# Patient Record
Sex: FEMALE | Race: WHITE | Employment: UNEMPLOYED | URBAN - METROPOLITAN AREA
[De-identification: names, ages, dates, MRNs, and addresses within clinical notes are randomized per-mention and may not be internally consistent; named-entity substitution may affect disease eponyms.]

---

## 2017-01-27 ENCOUNTER — OFFICE VISIT (OUTPATIENT)
Dept: FAMILY MEDICINE CLINIC | Facility: CLINIC | Age: 62
End: 2017-01-27

## 2017-01-27 ENCOUNTER — TELEPHONE (OUTPATIENT)
Dept: FAMILY MEDICINE CLINIC | Facility: CLINIC | Age: 62
End: 2017-01-27

## 2017-01-27 VITALS
WEIGHT: 113.38 LBS | HEART RATE: 90 BPM | TEMPERATURE: 98 F | BODY MASS INDEX: 19 KG/M2 | DIASTOLIC BLOOD PRESSURE: 62 MMHG | SYSTOLIC BLOOD PRESSURE: 92 MMHG

## 2017-01-27 DIAGNOSIS — R05.9 COUGH: Primary | ICD-10-CM

## 2017-01-27 PROCEDURE — 99212 OFFICE O/P EST SF 10 MIN: CPT | Performed by: FAMILY MEDICINE

## 2017-01-27 PROCEDURE — 99213 OFFICE O/P EST LOW 20 MIN: CPT | Performed by: FAMILY MEDICINE

## 2017-01-27 RX ORDER — AZITHROMYCIN 250 MG/1
TABLET, FILM COATED ORAL
Qty: 6 TABLET | Refills: 0 | Status: SHIPPED | OUTPATIENT
Start: 2017-01-27 | End: 2017-12-11

## 2017-01-27 NOTE — TELEPHONE ENCOUNTER
Dr. Jesse More - pt c/o dry cough since Wednesday accompanied with sob when laying down. 2 of her coworkers were recently dx with pna. Denies chest pain, sputum, edema. Pt only wanted to see you, but no appt available. I scheduled her with Dr. Krishnan Sat at 2p.

## 2017-01-30 NOTE — PROGRESS NOTES
HPI:    Antony Norton is a 62year old female presents to clinic with concerns of a cough. Patient states that 3 people at her work have CAP. Cough started on Monday and has been getting progressively worse.  Cough is dry but she feels like there is co Discomfort  Oxycodone                   Comment:Other reaction(s): Diarrhea  Oxycodone-Acetamino*    Nausea only    Comment:GI upset.   Sulfanilamide               Comment:Other reaction(s): Discomfort      ROS:   Review of Systems   Constitutional: Positiv (primary encounter diagnosis)  - because of recent exposure, will treat for CAP  - Azithromycin 5 day course to pharmacy  - supportive care, adequate hydration, and rest advised  - to follow up if no improvement in 5-7 days, will order a CXR        Orders

## 2017-10-24 ENCOUNTER — TELEPHONE (OUTPATIENT)
Dept: OTHER | Age: 62
End: 2017-10-24

## 2017-10-24 RX ORDER — AMOXICILLIN 500 MG/1
500 TABLET, FILM COATED ORAL 2 TIMES DAILY
Qty: 4 TABLET | Refills: 0 | Status: SHIPPED | OUTPATIENT
Start: 2017-10-24 | End: 2017-12-11

## 2017-10-24 NOTE — TELEPHONE ENCOUNTER
Dr Jaycob Dao, please advise. Pt says she has had a knee replacement and hip replacement and she has to have antibiotics every time she gets her teeth cleaned. Pt has appt tomorrow and needs antibiotics as soon as possible.  Pt says she gets 500 mg of Amoxi

## 2017-10-25 NOTE — TELEPHONE ENCOUNTER
Pt informed Rx sent by [facility] as requested. Denies further questions/concerns at this time.

## 2017-11-04 ENCOUNTER — TELEPHONE (OUTPATIENT)
Dept: OTHER | Age: 62
End: 2017-11-04

## 2017-11-04 NOTE — TELEPHONE ENCOUNTER
pt calling us for verification on the abx she picked up a couple days ago.  She stated that she usually needs to take a abx before having her teeth clean and she is used to the direction reading take 4 tablets of 500 mg of amoxicillin 1 hour before t

## 2017-12-11 ENCOUNTER — OFFICE VISIT (OUTPATIENT)
Dept: FAMILY MEDICINE CLINIC | Facility: CLINIC | Age: 62
End: 2017-12-11

## 2017-12-11 VITALS
HEIGHT: 64.5 IN | BODY MASS INDEX: 20.24 KG/M2 | HEART RATE: 75 BPM | TEMPERATURE: 97 F | RESPIRATION RATE: 16 BRPM | SYSTOLIC BLOOD PRESSURE: 100 MMHG | DIASTOLIC BLOOD PRESSURE: 65 MMHG | WEIGHT: 120 LBS

## 2017-12-11 DIAGNOSIS — M81.0 OSTEOPOROSIS, UNSPECIFIED OSTEOPOROSIS TYPE, UNSPECIFIED PATHOLOGICAL FRACTURE PRESENCE: ICD-10-CM

## 2017-12-11 DIAGNOSIS — G89.29 CHRONIC NECK PAIN: ICD-10-CM

## 2017-12-11 DIAGNOSIS — R51.9 NONINTRACTABLE HEADACHE, UNSPECIFIED CHRONICITY PATTERN, UNSPECIFIED HEADACHE TYPE: Primary | ICD-10-CM

## 2017-12-11 DIAGNOSIS — S12.9XXS COMPRESSION FRACTURE OF C-SPINE, SEQUELA: ICD-10-CM

## 2017-12-11 DIAGNOSIS — M54.2 CHRONIC NECK PAIN: ICD-10-CM

## 2017-12-11 PROBLEM — G44.309 POST-TRAUMATIC HEADACHE: Status: ACTIVE | Noted: 2017-12-11

## 2017-12-11 PROCEDURE — 99214 OFFICE O/P EST MOD 30 MIN: CPT | Performed by: FAMILY MEDICINE

## 2017-12-11 PROCEDURE — 99212 OFFICE O/P EST SF 10 MIN: CPT | Performed by: FAMILY MEDICINE

## 2017-12-11 RX ORDER — VITAMIN B COMPLEX
1 TABLET ORAL DAILY
COMMUNITY

## 2017-12-11 RX ORDER — PHENOL 1.4 %
600 AEROSOL, SPRAY (ML) MUCOUS MEMBRANE 2 TIMES DAILY
COMMUNITY

## 2017-12-11 NOTE — PATIENT INSTRUCTIONS
May need physical therapy. May benefit from physiatry. OMT done. Keep ENT appointments and use of mouth guard. MRI of cervical spine ordered.

## 2017-12-12 NOTE — PROGRESS NOTES
HPI:    Patient ID: Jesus Rolle is a 65year old female. Headache    This is a chronic problem. The current episode started more than 1 year ago. The problem occurs constantly. The problem has been waxing and waning.  The pain is located in the [place] ) 97.4 °F (36.3 °C)   TempSrc: Oral   Weight: 120 lb (54.4 kg)   Height: 5' 4.5\" (1.638 m)         Body mass index is 20.28 kg/m². PHYSICAL EXAM:   Physical Exam    Constitutional: She is oriented to person, place, and time and thin. No distress.    H

## 2017-12-22 ENCOUNTER — HOSPITAL ENCOUNTER (OUTPATIENT)
Dept: MRI IMAGING | Facility: HOSPITAL | Age: 62
Discharge: HOME | End: 2017-12-22
Attending: FAMILY MEDICINE
Payer: COMMERCIAL

## 2017-12-22 DIAGNOSIS — M54.2 CHRONIC NECK PAIN: ICD-10-CM

## 2017-12-22 DIAGNOSIS — G89.29 CHRONIC NECK PAIN: ICD-10-CM

## 2017-12-22 DIAGNOSIS — S12.9XXS COMPRESSION FRACTURE OF C-SPINE, SEQUELA: ICD-10-CM

## 2017-12-22 PROCEDURE — 72141 MRI NECK SPINE W/O DYE: CPT | Performed by: FAMILY MEDICINE

## 2017-12-27 ENCOUNTER — TELEPHONE (OUTPATIENT)
Dept: OTHER | Age: 62
End: 2017-12-27

## 2017-12-27 NOTE — TELEPHONE ENCOUNTER
Notes Recorded by Teressa Polk RN on 12/27/2017 at 3:31 PM CST  See encounter 12/27/17.  ------    Notes Recorded by Eb Shane DO on 12/25/2017 at 3:12 PM CST  There are some narrowed canal spaces seen on cervical canal. Also a possible finding

## 2017-12-29 ENCOUNTER — TELEPHONE (OUTPATIENT)
Dept: FAMILY MEDICINE CLINIC | Facility: CLINIC | Age: 62
End: 2017-12-29

## 2017-12-29 NOTE — TELEPHONE ENCOUNTER
----- Message from Jesus Hayes DO sent at 12/25/2017  3:12 PM CST -----  There are some narrowed canal spaces seen on cervical canal. Also a possible finding on one of the thoracic vertebrae. Recommend neurosurgery consult.  See if patient is agreea

## 2017-12-29 NOTE — TELEPHONE ENCOUNTER
Patient returned call. Gave patient results and recommendations. She would like to know what a neurosurgeon would be telling her differently besides surgery. Patient would like a better explanation of what could be done besides consulting a neurosurgeon.  P

## 2017-12-30 NOTE — TELEPHONE ENCOUNTER
Called patient - verified patient's name and  - pt states she talked to [facility] this morning and she does not need a referral.

## 2018-02-09 ENCOUNTER — NURSE TRIAGE (OUTPATIENT)
Dept: OTHER | Age: 63
End: 2018-02-09

## 2018-02-09 RX ORDER — NITROFURANTOIN 25; 75 MG/1; MG/1
100 CAPSULE ORAL 2 TIMES DAILY
Qty: 10 CAPSULE | Refills: 0 | Status: SHIPPED | OUTPATIENT
Start: 2018-02-09 | End: 2018-02-14

## 2018-02-09 NOTE — TELEPHONE ENCOUNTER
Please let pt know Rx sent to pharmacy, but very important to follow up in office in 1-2 wks to recheck urine.

## 2018-02-09 NOTE — TELEPHONE ENCOUNTER
Spoke with pt and Dr [employer] message given.   Pt verb understanding and agrees to call back to schedule f/u visit after completion of antibiotics

## 2018-03-05 ENCOUNTER — OFFICE VISIT (OUTPATIENT)
Dept: FAMILY MEDICINE CLINIC | Facility: CLINIC | Age: 63
End: 2018-03-05

## 2018-03-05 VITALS
HEIGHT: 64.5 IN | HEART RATE: 72 BPM | WEIGHT: 113 LBS | SYSTOLIC BLOOD PRESSURE: 103 MMHG | RESPIRATION RATE: 16 BRPM | DIASTOLIC BLOOD PRESSURE: 64 MMHG | TEMPERATURE: 98 F | BODY MASS INDEX: 19.06 KG/M2

## 2018-03-05 DIAGNOSIS — N39.0 URINARY TRACT INFECTION WITHOUT HEMATURIA, SITE UNSPECIFIED: Primary | ICD-10-CM

## 2018-03-05 LAB
BILIRUB UR QL: NEGATIVE
CLARITY UR: CLEAR
COLOR UR: YELLOW
GLUCOSE UR-MCNC: NEGATIVE MG/DL
HGB UR QL STRIP.AUTO: NEGATIVE
KETONES UR-MCNC: NEGATIVE MG/DL
LEUKOCYTE ESTERASE UR QL STRIP.AUTO: NEGATIVE
NITRITE UR QL STRIP.AUTO: NEGATIVE
PH UR: 6 [PH] (ref 5–8)
PROT UR-MCNC: NEGATIVE MG/DL
RBC #/AREA URNS AUTO: 1 /HPF
SP GR UR STRIP: 1.01 (ref 1–1.03)
UROBILINOGEN UR STRIP-ACNC: <2
VIT C UR-MCNC: 40 MG/DL
WBC #/AREA URNS AUTO: 1 /HPF

## 2018-03-05 PROCEDURE — 99213 OFFICE O/P EST LOW 20 MIN: CPT | Performed by: FAMILY MEDICINE

## 2018-03-05 PROCEDURE — 99212 OFFICE O/P EST SF 10 MIN: CPT | Performed by: FAMILY MEDICINE

## 2018-03-05 RX ORDER — NITROFURANTOIN 25; 75 MG/1; MG/1
CAPSULE ORAL
Refills: 0 | COMMUNITY
Start: 2018-02-09 | End: 2019-11-06 | Stop reason: ALTCHOICE

## 2018-03-05 NOTE — PROGRESS NOTES
HPI:    Patient ID: Jesus Rolle is a 65year old female. Recently treated for documented UTI. Symptoms have completely resolved. Dysuria    This is a new problem. The current episode started in the past 7 days.  The problem occurs every urinati sounds normal.   Neurological: She is alert and oriented to person, place, and time. No cranial nerve deficit. ASSESSMENT/PLAN:   1.  Urinary tract infection without hematuria, site unspecified  Studies sent  - URINALYSIS, ROUTINE; Future  - UR

## 2019-10-15 ENCOUNTER — TELEPHONE (OUTPATIENT)
Dept: FAMILY MEDICINE CLINIC | Facility: CLINIC | Age: 64
End: 2019-10-15

## 2019-10-15 RX ORDER — AMOXICILLIN 500 MG/1
CAPSULE ORAL
Qty: 4 CAPSULE | Refills: 0 | Status: SHIPPED | OUTPATIENT
Start: 2019-10-15 | End: 2019-11-06 | Stop reason: ALTCHOICE

## 2019-10-15 NOTE — TELEPHONE ENCOUNTER
Patient called in requested antibiotic that she usually get when she get her teeth clean.     Patient could not remember the name of the antibotic

## 2019-10-16 NOTE — TELEPHONE ENCOUNTER
Called pt to see when dentist appt.  What surgery did she have that requires her to take antibiotics before teeth cleaning     Please advise regarding medication request.

## 2019-11-06 ENCOUNTER — OFFICE VISIT (OUTPATIENT)
Dept: FAMILY MEDICINE CLINIC | Facility: CLINIC | Age: 64
End: 2019-11-06
Payer: COMMERCIAL

## 2019-11-06 VITALS
SYSTOLIC BLOOD PRESSURE: 103 MMHG | WEIGHT: 112.5 LBS | HEIGHT: 64.5 IN | HEART RATE: 67 BPM | BODY MASS INDEX: 18.97 KG/M2 | TEMPERATURE: 98 F | DIASTOLIC BLOOD PRESSURE: 59 MMHG

## 2019-11-06 DIAGNOSIS — Z01.818 PRE-OPERATIVE CLEARANCE: Primary | ICD-10-CM

## 2019-11-06 PROCEDURE — 99213 OFFICE O/P EST LOW 20 MIN: CPT | Performed by: FAMILY MEDICINE

## 2019-11-06 NOTE — PROGRESS NOTES
HPI:    Antonina Griffin is a 62year old female presents to clinic for preoperative physical.  Patient is having surgery to correct a deviated nasal septum on 11/14/2019 at [facility] with Dr. Bonny Anderson. Denies any acute concerns.   Had labs, chest x- Multiple Vitamin (MULTI-VITAMIN) Oral Tab Take 1 tablet by mouth daily.          Allergies:    Acetaminophen               Comment:Other reaction(s): Diarrhea  Acyclovir                   Comment:Other reaction(s): Rash  Cephalexin              HIVES    Com fax clearance to surgeon's office  -follow up as needed    Responsible party/patient verbalized understanding of information discussed. No barriers to learning observed.          Orders This Visit:  No orders of the defined types were placed in this encount

## 2019-12-04 ENCOUNTER — OFFICE VISIT (OUTPATIENT)
Dept: FAMILY MEDICINE CLINIC | Facility: CLINIC | Age: 64
End: 2019-12-04
Payer: COMMERCIAL

## 2019-12-04 ENCOUNTER — NURSE TRIAGE (OUTPATIENT)
Dept: FAMILY MEDICINE CLINIC | Facility: CLINIC | Age: 64
End: 2019-12-04

## 2019-12-04 VITALS
TEMPERATURE: 98 F | HEART RATE: 71 BPM | WEIGHT: 110 LBS | SYSTOLIC BLOOD PRESSURE: 98 MMHG | DIASTOLIC BLOOD PRESSURE: 61 MMHG | BODY MASS INDEX: 19 KG/M2 | RESPIRATION RATE: 16 BRPM

## 2019-12-04 DIAGNOSIS — R19.7 DIARRHEA, UNSPECIFIED TYPE: Primary | ICD-10-CM

## 2019-12-04 PROCEDURE — 99213 OFFICE O/P EST LOW 20 MIN: CPT | Performed by: FAMILY MEDICINE

## 2019-12-04 NOTE — TELEPHONE ENCOUNTER
Patient called in stating that she, , and mother all had the same dinner two night ago, only difference was she had a salad.      Since this dinner she has experienced diarrhea and having issues keeping food down  and is expressing concern that she m

## 2019-12-04 NOTE — TELEPHONE ENCOUNTER
She may, especially due to the recent antibiotic use. She would not be able to give sample at office. We have patients do that at home.    She would take the collection kits home with her

## 2019-12-04 NOTE — TELEPHONE ENCOUNTER
Action Requested: Summary for Provider     []  Critical Lab, Recommendations Needed  [x] Need Additional Advice  []   FYI    []   Need Orders  [] Need Medications Sent to Pharmacy  []  Other     SUMMARY: patient called in as she ordered dinner from [facility]

## 2019-12-05 ENCOUNTER — LAB ENCOUNTER (OUTPATIENT)
Dept: LAB | Age: 64
End: 2019-12-05
Attending: FAMILY MEDICINE
Payer: COMMERCIAL

## 2019-12-05 DIAGNOSIS — R19.7 DIARRHEA, UNSPECIFIED TYPE: ICD-10-CM

## 2019-12-05 PROCEDURE — 87493 C DIFF AMPLIFIED PROBE: CPT

## 2019-12-05 PROCEDURE — 87272 CRYPTOSPORIDIUM AG IF: CPT

## 2019-12-05 PROCEDURE — 87045 FECES CULTURE AEROBIC BACT: CPT

## 2019-12-05 PROCEDURE — 87427 SHIGA-LIKE TOXIN AG IA: CPT

## 2019-12-05 PROCEDURE — 87046 STOOL CULTR AEROBIC BACT EA: CPT

## 2019-12-05 PROCEDURE — 87329 GIARDIA AG IA: CPT

## 2019-12-05 NOTE — PROGRESS NOTES
HPI: Shawnee Pickard is a 62year old female who presents for diarrhea. Has had diarrhea since Sunday night. Ate salad from [facility]. Since then, she has had diarrhea. Has abdominal cramping and goes several times after she eats. No fever. Mild nausea.   No vo

## 2019-12-06 ENCOUNTER — TELEPHONE (OUTPATIENT)
Dept: FAMILY MEDICINE CLINIC | Facility: CLINIC | Age: 64
End: 2019-12-06

## 2019-12-06 RX ORDER — VANCOMYCIN HYDROCHLORIDE 125 MG/1
125 CAPSULE ORAL 4 TIMES DAILY
Qty: 40 CAPSULE | Refills: 0 | Status: SHIPPED | OUTPATIENT
Start: 2019-12-06 | End: 2019-12-16

## 2019-12-06 NOTE — TELEPHONE ENCOUNTER
Pt asking what type of food to eat , advised can eat regular food, diarrhea started by abx that caused c diff, verbalized understanding

## 2019-12-06 NOTE — TELEPHONE ENCOUNTER
Left message for patient to call back. If she calls please inform her that her stool was positive for C. difficile. This is a type and the diarrhea often associated with having taken clindamycin (note says she was recently on this).   Treated with vancomy

## 2019-12-06 NOTE — TELEPHONE ENCOUNTER
Spoke with Tay Aceves from [facility] and reports + C diff    Please advise    Sent to Dr. Juliana Tipton (out of office) and Dr. Jose Luis Patterson (on call)    Please reply to ny: TERENCE MUNOZ [address], [address] 12/06/2019

## 2019-12-10 ENCOUNTER — TELEPHONE (OUTPATIENT)
Dept: FAMILY MEDICINE CLINIC | Facility: CLINIC | Age: 64
End: 2019-12-10

## 2019-12-18 ENCOUNTER — TELEPHONE (OUTPATIENT)
Dept: FAMILY MEDICINE CLINIC | Facility: CLINIC | Age: 64
End: 2019-12-18

## 2019-12-18 NOTE — TELEPHONE ENCOUNTER
Has finished the course of antibiotics for  C Diff  Asking if she needed any specific  follow up , states diarrhea is improved, loose and soft stool  Frequency is now three time day   She is taking probiotics     Asking what is next step ? ?   Does she need

## 2019-12-18 NOTE — TELEPHONE ENCOUNTER
No need to repeat labs unless she continues to have symptoms. No need to do anything else at this time.

## 2020-01-16 ENCOUNTER — OFFICE VISIT (OUTPATIENT)
Dept: FAMILY MEDICINE CLINIC | Facility: CLINIC | Age: 65
End: 2020-01-16
Payer: COMMERCIAL

## 2020-01-16 VITALS
DIASTOLIC BLOOD PRESSURE: 53 MMHG | TEMPERATURE: 98 F | SYSTOLIC BLOOD PRESSURE: 91 MMHG | WEIGHT: 111 LBS | BODY MASS INDEX: 19 KG/M2 | HEART RATE: 66 BPM | RESPIRATION RATE: 16 BRPM

## 2020-01-16 DIAGNOSIS — Z96.60 HISTORY OF JOINT REPLACEMENT, UNSPECIFIED JOINT: ICD-10-CM

## 2020-01-16 DIAGNOSIS — J01.01 ACUTE RECURRENT MAXILLARY SINUSITIS: Primary | ICD-10-CM

## 2020-01-16 DIAGNOSIS — A04.72 CLOSTRIDIUM DIFFICILE DIARRHEA: ICD-10-CM

## 2020-01-16 PROCEDURE — 99213 OFFICE O/P EST LOW 20 MIN: CPT | Performed by: FAMILY MEDICINE

## 2020-01-16 NOTE — PROGRESS NOTES
HPI: Laura Garcia is a 62year old female who presents for sinusitis. Had recent septoplasty. She had a headache on the right of face for the past 2 days. Improved today. No headache today.  Having thick drainage on right and has trouble breathing out of right diagnosis)    Symptoms improved so will not treat at this time. Advised to follow-up with ENT. Clostridium difficile diarrhea     Symptoms improved.      History of joint replacement, unspecified joint    Informed her that she does not need abx prophyla

## 2020-03-20 ENCOUNTER — TELEPHONE (OUTPATIENT)
Dept: FAMILY MEDICINE CLINIC | Facility: CLINIC | Age: 65
End: 2020-03-20

## 2020-03-20 NOTE — TELEPHONE ENCOUNTER
Patient calling stating in mid February she got really sick with a cough, shortness of breath, and fever   Patient  states she is feeling better now but has a mild dry cough, no fever.    Patient is concerned because during the time she was sick she had her

## 2020-03-20 NOTE — TELEPHONE ENCOUNTER
Called patient. Does not meet criteria for testing. All questions answered. Will call back with further concerns. Responsible party/patient verbalized understanding of information discussed. No barriers to learning observed.

## 2020-03-30 ENCOUNTER — NURSE TRIAGE (OUTPATIENT)
Dept: FAMILY MEDICINE CLINIC | Facility: CLINIC | Age: 65
End: 2020-03-30

## 2020-03-30 DIAGNOSIS — A49.8 CLOSTRIDIUM DIFFICILE INFECTION: Primary | ICD-10-CM

## 2020-03-30 PROCEDURE — 99212 OFFICE O/P EST SF 10 MIN: CPT | Performed by: FAMILY MEDICINE

## 2020-03-30 RX ORDER — VANCOMYCIN HYDROCHLORIDE 250 MG/1
250 CAPSULE ORAL 4 TIMES DAILY
Qty: 56 CAPSULE | Refills: 0 | Status: SHIPPED | OUTPATIENT
Start: 2020-03-30

## 2020-03-30 NOTE — TELEPHONE ENCOUNTER
Virtual/Telephone Check-In    Sudheer Casey verbally consents to a Virtual/Telephone Check-In service on 03/30/20. Patient understands and accepts financial responsibility for any deductible, co-insurance and/or co-pays associated with this service.

## 2020-03-30 NOTE — TELEPHONE ENCOUNTER
Action Requested: Summary for Provider     []  Critical Lab, Recommendations Needed  [] Need Additional Advice  []   FYI    []   Need Orders  [] Need Medications Sent to Pharmacy  []  Other     SUMMARY: Per protocol OV today is appropriate.  Pt would prefer

## 2020-08-08 ENCOUNTER — TELEPHONE (OUTPATIENT)
Dept: FAMILY MEDICINE CLINIC | Facility: CLINIC | Age: 65
End: 2020-08-08

## 2020-08-08 NOTE — TELEPHONE ENCOUNTER
Patient  calling stating she had a knee surgery in 2010 at [facility]. Per patient, she has an order from [facility] to get an x-ray of her knee. Patient unsure if she needs a new order from Dr. Conchita Ace to get x-ray completed.      Patient transferre

## 2020-09-10 ENCOUNTER — APPOINTMENT (OUTPATIENT)
Dept: GENERAL RADIOLOGY | Age: 65
End: 2020-09-10
Attending: NURSE PRACTITIONER
Payer: COMMERCIAL

## 2020-09-10 ENCOUNTER — TELEPHONE (OUTPATIENT)
Dept: FAMILY MEDICINE CLINIC | Facility: CLINIC | Age: 65
End: 2020-09-10

## 2020-09-10 ENCOUNTER — HOSPITAL ENCOUNTER (OUTPATIENT)
Age: 65
Discharge: HOME | End: 2020-09-10
Payer: COMMERCIAL

## 2020-09-10 VITALS
SYSTOLIC BLOOD PRESSURE: 105 MMHG | BODY MASS INDEX: 19.63 KG/M2 | TEMPERATURE: 97 F | WEIGHT: 115 LBS | RESPIRATION RATE: 18 BRPM | HEIGHT: 64 IN | DIASTOLIC BLOOD PRESSURE: 64 MMHG | HEART RATE: 74 BPM | OXYGEN SATURATION: 99 %

## 2020-09-10 DIAGNOSIS — M62.830 SPASM OF MUSCLE OF LOWER BACK: Primary | ICD-10-CM

## 2020-09-10 DIAGNOSIS — M54.40 ACUTE RIGHT-SIDED LOW BACK PAIN WITH SCIATICA, SCIATICA LATERALITY UNSPECIFIED: ICD-10-CM

## 2020-09-10 PROCEDURE — 73502 X-RAY EXAM HIP UNI 2-3 VIEWS: CPT | Performed by: NURSE PRACTITIONER

## 2020-09-10 PROCEDURE — 99203 OFFICE O/P NEW LOW 30 MIN: CPT | Performed by: NURSE PRACTITIONER

## 2020-09-10 PROCEDURE — 72100 X-RAY EXAM L-S SPINE 2/3 VWS: CPT | Performed by: NURSE PRACTITIONER

## 2020-09-10 RX ORDER — HYDROCODONE BITARTRATE AND ACETAMINOPHEN 5; 325 MG/1; MG/1
1 TABLET ORAL ONCE
Status: COMPLETED | OUTPATIENT
Start: 2020-09-10 | End: 2020-09-10

## 2020-09-10 RX ORDER — HYDROCODONE BITARTRATE AND ACETAMINOPHEN 5; 325 MG/1; MG/1
1-2 TABLET ORAL 2 TIMES DAILY PRN
Qty: 10 TABLET | Refills: 0 | Status: SHIPPED | OUTPATIENT
Start: 2020-09-10

## 2020-09-10 NOTE — TELEPHONE ENCOUNTER
Action Requested: Summary for Provider     []  Critical Lab, Recommendations Needed  [] Need Additional Advice  []   FYI    []   Need Orders  [] Need Medications Sent to Pharmacy  []  Other     SUMMARY: Pt going to IC for back pain,    Pt states yesterday

## 2020-09-10 NOTE — ED PROVIDER NOTES
Patient Seen [address]: [address]      History   Patient presents with:  Back Pain    Stated Complaint: BACK PAIN    HPI    This is a 67-year-old female presenting with right lower back pain.   Patient states after coughing las as noted above.     Physical Exam     ED Triage Vitals [09/10/20 3164]   /64   Pulse 74   Resp 18   Temp 97.1 °F (36.2 °C)   Temp src    SpO2 99 %   O2 Device None (Room air)       Current:/64   Pulse 74   Temp 97.1 °F (36.2 °C)   Resp 18   Ht 1 but new since 6/28/11 CT. 2.  Chronic degenerative disc disease and osteoarthritis within the lumbar spine most advanced at L4-S1.    Dictated by (CST): Ara Du MD on 9/10/2020 at 5:22 PM     Finalized by (CST): Ara Du MD on 9/10/2020 at 5:24 PM Discussed medications that can be prescribed discussed muscle relaxer. Patient states she does not want to take a muscle relaxer as she is very sensitive to medications or new medications and does not want to have any type of reaction.   Discussed with the

## 2020-09-10 NOTE — ED INITIAL ASSESSMENT (HPI)
Pt here with complaints of right lower back pain , pt states she was cough bad last and heard a loud pop to her right lower back and fell to the ground, pt has a hx of compression fractures , pt has limited rom

## 2021-06-04 ENCOUNTER — NURSE TRIAGE (OUTPATIENT)
Dept: FAMILY MEDICINE CLINIC | Facility: CLINIC | Age: 66
End: 2021-06-04

## 2021-06-04 NOTE — TELEPHONE ENCOUNTER
Action Requested: Summary for Provider     []  Critical Lab, Recommendations Needed  [] Need Additional Advice  []   FYI    []   Need Orders  [] Need Medications Sent to Pharmacy  []  Other     SUMMARY: Patient will go to IC today for chest pressure and pa

## 2021-10-21 ENCOUNTER — MED REC SCAN ONLY (OUTPATIENT)
Dept: FAMILY MEDICINE CLINIC | Facility: CLINIC | Age: 66
End: 2021-10-21

## 2022-10-26 ENCOUNTER — MED REC SCAN ONLY (OUTPATIENT)
Dept: FAMILY MEDICINE CLINIC | Facility: CLINIC | Age: 67
End: 2022-10-26

## 2022-12-27 ENCOUNTER — MED REC SCAN ONLY (OUTPATIENT)
Dept: FAMILY MEDICINE CLINIC | Facility: CLINIC | Age: 67
End: 2022-12-27

## 2023-03-17 ENCOUNTER — MED REC SCAN ONLY (OUTPATIENT)
Dept: FAMILY MEDICINE CLINIC | Facility: CLINIC | Age: 68
End: 2023-03-17

## 2023-05-09 ENCOUNTER — MED REC SCAN ONLY (OUTPATIENT)
Dept: FAMILY MEDICINE CLINIC | Facility: CLINIC | Age: 68
End: 2023-05-09

## 2023-05-19 ENCOUNTER — TELEPHONE (OUTPATIENT)
Dept: FAMILY MEDICINE CLINIC | Facility: CLINIC | Age: 68
End: 2023-05-19

## 2023-06-19 ENCOUNTER — PATIENT OUTREACH (OUTPATIENT)
Dept: CASE MANAGEMENT | Age: 68
End: 2023-06-19

## 2023-06-19 NOTE — PROCEDURES
The office order for PCP removal request is Approved and finalized on June 19, 2023.     Thanks,  [facility] [employer]

## 2023-07-25 ENCOUNTER — MED REC SCAN ONLY (OUTPATIENT)
Dept: FAMILY MEDICINE CLINIC | Facility: CLINIC | Age: 68
End: 2023-07-25

## 2023-10-28 ENCOUNTER — MED REC SCAN ONLY (OUTPATIENT)
Dept: FAMILY MEDICINE CLINIC | Facility: CLINIC | Age: 68
End: 2023-10-28

## 2024-05-06 ENCOUNTER — OFFICE VISIT (OUTPATIENT)
Dept: FAMILY MEDICINE CLINIC | Facility: CLINIC | Age: 69
End: 2024-05-06
Payer: COMMERCIAL

## 2024-05-06 VITALS
SYSTOLIC BLOOD PRESSURE: 103 MMHG | RESPIRATION RATE: 18 BRPM | DIASTOLIC BLOOD PRESSURE: 65 MMHG | BODY MASS INDEX: 20 KG/M2 | TEMPERATURE: 98 F | WEIGHT: 119 LBS | HEART RATE: 73 BPM

## 2024-05-06 DIAGNOSIS — M80.08XS VERTEBRAL FRACTURE, OSTEOPOROTIC, SEQUELA: ICD-10-CM

## 2024-05-06 DIAGNOSIS — M80.00XG AGE-RELATED OSTEOPOROSIS WITH CURRENT PATHOLOGICAL FRACTURE WITH DELAYED HEALING, SUBSEQUENT ENCOUNTER: ICD-10-CM

## 2024-05-06 DIAGNOSIS — Z13.820 ENCOUNTER FOR OSTEOPOROSIS SCREENING IN ASYMPTOMATIC POSTMENOPAUSAL PATIENT: Primary | ICD-10-CM

## 2024-05-06 DIAGNOSIS — Z12.31 ENCOUNTER FOR SCREENING MAMMOGRAM FOR BREAST CANCER: ICD-10-CM

## 2024-05-06 DIAGNOSIS — Z12.11 COLON CANCER SCREENING: ICD-10-CM

## 2024-05-06 DIAGNOSIS — Z78.0 ENCOUNTER FOR OSTEOPOROSIS SCREENING IN ASYMPTOMATIC POSTMENOPAUSAL PATIENT: Primary | ICD-10-CM

## 2024-05-06 NOTE — PATIENT INSTRUCTIONS
To ortho for assessment for potential kyphoplasty.  Patient referred to endocrinology for new provider to assume care for osteoporosis.

## 2024-05-06 NOTE — PROGRESS NOTES
Subjective:     Patient ID: Rosette Lou is a 68 year old female.    This patient is a 68-year-old  female with a history for advanced osteoporosis who unfortunately has developed several compression fractures in her vertebral column.  The patient has been under specialty care for several years regarding this matter.  Patient has not had kyphoplasty performed.  It was discussed at some point, but the specialist was professionally apprehensive as this might create instability to the rest of the vertebral column.    Patient is seeking any type of intervention that might bring her some semblance of relief at least temporarily throughout the day.  Patient prefers not to be on narcotic pain medication.    Patient also needs a new endocrinologist to assume the care for her osteoporosis as her specialist has now retired.    Patient is due for updating her mammogram and also needs a referral for colonoscopy.        History/Other:   Review of Systems  Current Outpatient Medications   Medication Sig Dispense Refill    Multiple Vitamin (MULTI-VITAMIN) Oral Tab Take 1 tablet by mouth daily.      HYDROcodone-acetaminophen 5-325 MG Oral Tab Take 1-2 tablets by mouth 2 (two) times daily as needed for Pain. (Patient not taking: Reported on 5/6/2024) 10 tablet 0    vancomycin HCl 250 MG Oral Cap Take 1 capsule (250 mg total) by mouth 4 (four) times daily. 56 capsule 0    B Complex Vitamins (VITAMIN-B COMPLEX) Oral Tab Take 1 tablet by mouth daily.      Calcium Carbonate 600 MG Oral Tab Take 600 mg by mouth 2 (two) times daily. Take 1 1/2 tablet BID (Patient not taking: Reported on 5/6/2024)       Allergies:  Allergies   Allergen Reactions    Clindamycin PAIN     abd pain and diarrhea, c-diff    Acyclovir      Other reaction(s): Rash    Cephalexin HIVES     Other reaction(s): Other  \"Sensitivity.\"    Morphine      Other reaction(s): Discomfort    Sulfanilamide      Other reaction(s): Discomfort       Past Medical  History:    Chicken pox    Hip fracture, right (HCC)    Measles    Miscarriage (HCC)    x2    Mumps    Musculoskeletal disorder    Tibial, fibula, pleteau fracture    Osteoporosis    Management:  drug therapy    Osteoporosis screening    Per [employer]      Past Surgical History:   Procedure Laterality Date    D & c      after two miscarriages    Knee replacement surgery Right     Musculoskeletal surgery unlisted Right     Repair (of right hip fracture).    Musculoskeletal surgery unlisted      Repair ( of femur fracture)    Other surgical history        Family History   Problem Relation Age of Onset    Breast Cancer Maternal Aunt         (2 aunts; one is still living and one is .    Dementia Maternal Grandmother         Alzheimer's    Heart Disease Father     Hypertension Father     Kidney Disease Sister       Social History:   Social History     Socioeconomic History    Marital status:    Tobacco Use    Smoking status: Never    Smokeless tobacco: Never   Substance and Sexual Activity    Alcohol use: Yes     Alcohol/week: 0.0 standard drinks of alcohol     Comment: Rarely.    Drug use: No   Other Topics Concern    Caffeine Concern Yes     Comment: Coffee, 1 cup daily.     Social Determinants of Health      [facility]    Social Connections    Received from [facility], [facility]    Housing Stability        Objective:   Vitals:    24 1735   BP: 103/65   Pulse: 73   Resp: 18   Temp: 97.5 °F (36.4 °C)       Physical Exam  Constitutional:       Appearance: Normal appearance. She is not ill-appearing.   HENT:      Head: Normocephalic and atraumatic.   Cardiovascular:      Rate and Rhythm: Normal rate and regular rhythm.      Heart sounds:      No gallop.   Pulmonary:      Effort: Pulmonary effort is normal.      Breath sounds: Normal breath sounds.   Musculoskeletal:      Cervical back: Tenderness  present. Pain with movement present. Decreased range of motion.      Thoracic back: Tenderness and bony tenderness present. Decreased range of motion.      Comments: Exaggerated kyphosis in the thoracic region noted by visual observation.  Patient vertebral column complaints are subjective.   Neurological:      Mental Status: She is alert and oriented to person, place, and time.         Assessment & Plan:   1. Encounter for osteoporosis screening in asymptomatic postmenopausal patient  Ordered.  - XR DEXA BONE DENSITOMETRY (CPT=77080); Future    2. Encounter for screening mammogram for breast cancer  Ordered.  - [facility] PHILLIP 2D+3D SCREENING BILAT (CPT=77067/81480); Future    3. Colon cancer screening  Referred.  - Gastro Referral - [facility] ([place])    4. Age-related osteoporosis with current pathological fracture with delayed healing, subsequent encounter  Referred.  - Endocrine Referral - [facility] ([place]    5. Vertebral fracture, osteoporotic, sequela  Referred.  - Ortho Referral - [facility]      No orders of the defined types were placed in this encounter.      Meds This Visit:  Requested Prescriptions      No prescriptions requested or ordered in this encounter       Imaging & Referrals:  GASTRO - INTERNAL  ENDOCRINOLOGY - INTERNAL  ORTHOPEDIC - INTERNAL  XR DEXA BONE DENSITOMETRY (CPT=77080)  [facility] PHILLIP 2D+3D SCREENING BILAT (CPT=77067/93998)     Patient Instructions   To ortho for assessment for potential kyphoplasty.  Patient referred to endocrinology for new provider to assume care for osteoporosis.    Return if symptoms worsen or fail to improve.

## 2024-05-07 ENCOUNTER — TELEPHONE (OUTPATIENT)
Dept: ORTHOPEDICS CLINIC | Facility: CLINIC | Age: 69
End: 2024-05-07

## 2024-05-07 NOTE — TELEPHONE ENCOUNTER
Pt called.  Pt has a referral for vertebral fracture, osteoporotic, sequela. No sooner appointments available.  Please call

## 2024-05-07 NOTE — TELEPHONE ENCOUNTER
Called patient and she states she has had a series of Vertebral fractures. She reports C3 in 2008, T6 in 2022, T8 in 2008, L2 in 2023 and t11/12 she is unsure when. She states she has seen various orthopedic surgeons and pain management drs who have been unable to help her with her pain. Pcp referred to Dr Osborne. I offered appointment on 5/21 at 140pm with Dr Osborne. Address given. Advised to bring any imaging disc/reports to appointment. She verbalized understanding and had no further concerns.

## 2024-05-21 ENCOUNTER — HOSPITAL ENCOUNTER (OUTPATIENT)
Dept: GENERAL RADIOLOGY | Facility: HOSPITAL | Age: 69
Discharge: HOME | End: 2024-05-21
Attending: ORTHOPAEDIC SURGERY

## 2024-05-21 ENCOUNTER — OFFICE VISIT (OUTPATIENT)
Dept: ORTHOPEDICS CLINIC | Facility: CLINIC | Age: 69
End: 2024-05-21

## 2024-05-21 DIAGNOSIS — R52 PAIN: ICD-10-CM

## 2024-05-21 DIAGNOSIS — G89.29 CHRONIC BILATERAL THORACIC BACK PAIN: Primary | ICD-10-CM

## 2024-05-21 DIAGNOSIS — M54.6 CHRONIC BILATERAL THORACIC BACK PAIN: Primary | ICD-10-CM

## 2024-05-21 PROCEDURE — 99244 OFF/OP CNSLTJ NEW/EST MOD 40: CPT | Performed by: ORTHOPAEDIC SURGERY

## 2024-05-21 PROCEDURE — 72072 X-RAY EXAM THORAC SPINE 3VWS: CPT | Performed by: ORTHOPAEDIC SURGERY

## 2024-05-21 NOTE — H&P
NURSING INTAKE COMMENTS:   Chief Complaint   Patient presents with    Fracture     Consult - Compression fracture- T6 and T8 pain - Pt states multiple fracture over the past  year. States some muscle burn. Pt brought in MRI from 2022.       HPI: This 68 year old female presents today with chronic thoracic pain.  It wraps a little around her sides but nothing into the front and nothing down the legs.  She has known compression deformities for several years.  She was treated for osteoporosis with 2 separate medications and neither helped.  She is currently not treating.  She has never had kyphoplasty's.  She has not seen the pain doctors at this time.    She is a retired .  She and her  live in a house with stairs.  She drives a car.  She likes crafts such as making photo albums and deepthi.  He is not diabetic.  She is slender and in good shape physically.  She denies bowel or bladder problems or balance disorder.  She denies numbness, tingling, weakness, bowel or bladder dysfunction, or balance disorder.    Past Medical History:    Chicken pox    Hip fracture, right (HCC)    Measles    Miscarriage (HCC)    x2    Mumps    Musculoskeletal disorder    Tibial, fibula, pleteau fracture    Osteoporosis    Management:  drug therapy    Osteoporosis screening    Per [employer]     Past Surgical History:   Procedure Laterality Date    D & c      after two miscarriages    Knee replacement surgery Right     Musculoskeletal surgery unlisted Right     Repair (of right hip fracture).    Musculoskeletal surgery unlisted      Repair ( of femur fracture)    Other surgical history       Current Outpatient Medications   Medication Sig Dispense Refill    vancomycin HCl 250 MG Oral Cap Take 1 capsule (250 mg total) by mouth 4 (four) times daily. 56 capsule 0    B Complex Vitamins (VITAMIN-B COMPLEX) Oral Tab Take 1 tablet by mouth daily.      Calcium Carbonate 600 MG Oral Tab Take 1 tablet (600 mg total) by mouth 2 (two)  times daily. Take 1 1/2 tablet BID      Multiple Vitamin (MULTI-VITAMIN) Oral Tab Take 1 tablet by mouth daily.      HYDROcodone-acetaminophen 5-325 MG Oral Tab Take 1-2 tablets by mouth 2 (two) times daily as needed for Pain. (Patient not taking: Reported on 2024) 10 tablet 0     Allergies   Allergen Reactions    Clindamycin PAIN     abd pain and diarrhea, c-diff    Acyclovir      Other reaction(s): Rash    Cephalexin HIVES     Other reaction(s): Other  \"Sensitivity.\"    Morphine      Other reaction(s): Discomfort    Sulfanilamide      Other reaction(s): Discomfort     Family History   Problem Relation Age of Onset    Breast Cancer Maternal Aunt         (2 aunts; one is still living and one is .    Dementia Maternal Grandmother         Alzheimer's    Heart Disease Father     Hypertension Father     Kidney Disease Sister      No family Hx of DVT/PE    Social History     Occupational History    Not on file   Tobacco Use    Smoking status: Never    Smokeless tobacco: Never   Substance and Sexual Activity    Alcohol use: Yes     Alcohol/week: 0.0 standard drinks of alcohol     Comment: Rarely.    Drug use: No    Sexual activity: Not on file        Review of Systems:  GENERAL: feels generally well, no significant weight loss or weight gain  SKIN: no ulcerated or worrisome skin lesions  EYES:denies blurred vision or double vision  HEENT: denies new nasal congestion, sinus pain or ST  LUNGS: denies shortness of breath  CARDIOVASCULAR: denies chest pain  GI: no hematemesis, no worsening heartburn, no diarrhea  : no dysuria, no blood in urine, no difficulty urinating, no incontinence  MUSCULOSKELETAL: no other musculoskeletal complaints other than in HPI  NEURO: no numbness or tingling, no weakness or balance disorder  PSYCHE: no depression or anxiety  HEMATOLOGIC: no hx of blood dyscrasia, no Hx DVT/PE  ENDOCRINE: no thyroid or diabetes issues  ALL/ASTHMA: no new hx of severe allergy or asthma    Physical  Examination:    There were no vitals taken for this visit.  Constitutional: appears well hydrated, alert and responsive, no acute distress noted  Extremities: The thoracic, lumbar, and sacral spines had no masses.  There was slight excessive kyphosis of the mid thoracic spine.  She can toe walk, heel walk, squat fully,.  Musculoskeletal: Tenderness was mild to the mid to lower central thoracic spine.  Neurological: Normal motor, sensory, reflex function in both upper and lower extremities.  Absent thoracic reflexes.    Imaging: X-rays of the lumbar spine and thoracic spine both show diffuse osteopenia and some milder diffuse spondylosis.  2 compression fractures obvious in the thoracic spine age-indeterminate.      No results found.     Lab Results   Component Value Date    WBC 7.3 09/24/2012    HGB 13.4 09/24/2012     09/24/2012      No results found for: \"GLU\", \"BUN\", \"CREATSERUM\", \"GFR\", \"GFRNAA\", \"GFRAA\"     Assessment and Plan:  Diagnoses and all orders for this visit:    Chronic bilateral thoracic back pain  -     MRI SPINE THORACIC (CPT=72146); Future    Pain  -     Cancel: XR LUMBAR SPINE COMPLETE W/FLEX + EXT (CPT=72114); Future  -     XR THORACIC SPINE (3 VIEWS) (CPT=72072); Future        Assessment: Chronic thoracic pain.  Failed a lot of modalities.  She said physical therapy made her worse.  Osteoporosis treatments did not yield good results.    Plan: After discussion, I told there is not much else I can offer.  She agreed to a new MRI of the thoracic spine.  We can talk by telephone about the results once it is complete and I asked that she make a phone message to the office once that is complete.  Will likely send her to the pain center.  If there is a significant compression fracture, we can talk about kyphoplasty.  For now we will talk by telephone about the thoracic MRI and further recommendations will be forthcoming.    Follow Up: No follow-ups on file.    Qamar Osborne MD

## 2024-06-17 ENCOUNTER — TELEPHONE (OUTPATIENT)
Dept: FAMILY MEDICINE CLINIC | Facility: CLINIC | Age: 69
End: 2024-06-17

## 2024-06-17 DIAGNOSIS — M54.2 CERVICAL PAIN: ICD-10-CM

## 2024-06-17 DIAGNOSIS — S32.000A COMPRESSION FRACTURE OF LUMBAR VERTEBRA, UNSPECIFIED LUMBAR VERTEBRAL LEVEL, INITIAL ENCOUNTER (HCC): Primary | ICD-10-CM

## 2024-06-17 DIAGNOSIS — M54.6 THORACIC BACK PAIN, UNSPECIFIED BACK PAIN LATERALITY, UNSPECIFIED CHRONICITY: ICD-10-CM

## 2024-06-17 DIAGNOSIS — M54.50 LUMBAR PAIN: ICD-10-CM

## 2024-06-17 NOTE — TELEPHONE ENCOUNTER
Per patient she has had several compression fractures. She believes she had another compression fracture a few days ago around L3 area. Patient states she went to see Dr. Biggs and he told her the previous fractures were too old for him to assist with. Patient has reached out to [facility] to ortho department and they advised in order to be considered she would have to have an MRI of her whole spine with and without contrast. Patient is asking if you would order? Patient was seen on 5/6/24 and compression fracture history/osteoporosis was discussed.      Pended for review if okay

## 2024-07-08 ENCOUNTER — TELEPHONE (OUTPATIENT)
Dept: FAMILY MEDICINE CLINIC | Facility: CLINIC | Age: 69
End: 2024-07-08

## 2024-07-08 DIAGNOSIS — S32.000A COMPRESSION FRACTURE OF LUMBAR VERTEBRA, UNSPECIFIED LUMBAR VERTEBRAL LEVEL, INITIAL ENCOUNTER (HCC): Primary | ICD-10-CM

## 2024-07-08 NOTE — TELEPHONE ENCOUNTER
Patient called stating she is scheduled for an appointment on 07/10/2024 for an MRI of her back. She is requesting medication to take because she doesn't think she will be able to complete the exam without medication.

## 2024-07-08 NOTE — TELEPHONE ENCOUNTER
RN contacted the patient regarding medication request, if she needs pain medication, muscle relaxant or anti anxiety .   Per patient, she is scheduled to do the whole body MRI on Wednesday.   She has osteoporosis and several compression fracture .  She would like to be ' knocked out \" for the procedure, laying flat is worse .   It doesn't matter if it is pain medication or something else., she would  be in so much pain during the procedure Preferred pharmacy verified.     PATIENT WOULD LIKE TO KNOW THE MEDICATION FIRST PRIOR TO SENDING IT TO THE PHARMACY, CONTACT NUMBER ON FILE.         Future Appointments   Date Time Provider Department Center   7/10/2024  8:30 AM [facility] MRI RM2 (3T WIDE) [facility] [place]   7/10/2024  9:30 AM [facility] MRI RM2 (3T WIDE) [facility] [place]   7/10/2024 10:30 AM [facility] MRI RM2 (3T WIDE) [facility] [place]   10/16/2024 12:30 PM Deisy Martin MD [facility]

## 2024-07-09 RX ORDER — DIAZEPAM 2 MG/1
TABLET ORAL
Qty: 10 TABLET | Refills: 0 | Status: SHIPPED | OUTPATIENT
Start: 2024-07-09

## 2024-07-09 RX ORDER — HYDROCODONE BITARTRATE AND ACETAMINOPHEN 5; 325 MG/1; MG/1
1-2 TABLET ORAL 2 TIMES DAILY PRN
Qty: 10 TABLET | Refills: 0 | Status: SHIPPED | OUTPATIENT
Start: 2024-07-09

## 2024-07-09 NOTE — TELEPHONE ENCOUNTER
Spoke with the patient,verified full name and     Informed her of message and instructions below    Asking what medication was sent    Reviewed medication, stated did not ask for Diazepam advised her to only  if she is not able to tolerate MRI.    Stated never had a problem.    No further questions

## 2024-07-10 ENCOUNTER — HOSPITAL ENCOUNTER (OUTPATIENT)
Dept: MRI IMAGING | Facility: HOSPITAL | Age: 69
Discharge: HOME | End: 2024-07-10
Attending: FAMILY MEDICINE
Payer: COMMERCIAL

## 2024-07-10 DIAGNOSIS — M54.6 THORACIC BACK PAIN, UNSPECIFIED BACK PAIN LATERALITY, UNSPECIFIED CHRONICITY: ICD-10-CM

## 2024-07-10 DIAGNOSIS — M54.2 CERVICAL PAIN: ICD-10-CM

## 2024-07-10 DIAGNOSIS — S32.000A COMPRESSION FRACTURE OF LUMBAR VERTEBRA, UNSPECIFIED LUMBAR VERTEBRAL LEVEL, INITIAL ENCOUNTER (HCC): ICD-10-CM

## 2024-07-10 DIAGNOSIS — M54.50 LUMBAR PAIN: ICD-10-CM

## 2024-07-10 PROCEDURE — 72158 MRI LUMBAR SPINE W/O & W/DYE: CPT | Performed by: FAMILY MEDICINE

## 2024-07-10 PROCEDURE — 72157 MRI CHEST SPINE W/O & W/DYE: CPT | Performed by: FAMILY MEDICINE

## 2024-07-10 PROCEDURE — A9575 INJ GADOTERATE MEGLUMI 0.1ML: HCPCS | Performed by: FAMILY MEDICINE

## 2024-07-10 PROCEDURE — 72156 MRI NECK SPINE W/O & W/DYE: CPT | Performed by: FAMILY MEDICINE

## 2024-07-10 RX ORDER — DIPHENHYDRAMINE HYDROCHLORIDE 50 MG/ML
10 INJECTION, SOLUTION INTRAMUSCULAR; INTRAVENOUS
Status: COMPLETED | OUTPATIENT
Start: 2024-07-10 | End: 2024-07-10

## 2024-07-10 RX ADMIN — DIPHENHYDRAMINE HYDROCHLORIDE 10 ML: 50 INJECTION, SOLUTION INTRAMUSCULAR; INTRAVENOUS at 10:41:00

## 2024-07-11 ENCOUNTER — TELEPHONE (OUTPATIENT)
Dept: FAMILY MEDICINE CLINIC | Facility: CLINIC | Age: 69
End: 2024-07-11

## 2024-07-11 NOTE — TELEPHONE ENCOUNTER
Patient contacts clinic regarding MRI performed yesterday.  She is aware that Dr. Wyatt has yet to review results and recommendations.  She states she discussed a referral to [facility] and inquires on this process.  She will await Dr. Wyatt's review and recommendations.

## 2024-07-17 ENCOUNTER — TELEPHONE (OUTPATIENT)
Dept: FAMILY MEDICINE CLINIC | Facility: CLINIC | Age: 69
End: 2024-07-17

## 2024-07-17 DIAGNOSIS — R79.89 ELEVATED SERUM CREATININE: Primary | ICD-10-CM

## 2024-07-17 RX ORDER — NABUMETONE 500 MG/1
500 TABLET, FILM COATED ORAL 2 TIMES DAILY
Refills: 0 | Status: CANCELLED | OUTPATIENT
Start: 2024-07-17

## 2024-07-17 NOTE — TELEPHONE ENCOUNTER
Patient's chart has been reviewed.  The patient's most current BMP from February 19, 2024 reveals mildly compromised kidney function with a filtration rate at 50.  Anti-inflammatory medication usage may further compromise her kidney function and I am not comfortable in prescribing this requested medication.  Thank you.    MRI of cervical, thoracic, and lumbar region has been reviewed.  There are multiple levels of degenerative changes as well as nerve root tunnel narrowing mainly in the neck region and the low back region.  There is evidence of a compression fracture of indeterminate age at the thoracic #12 level.  These findings do need follow-up with the specialist and if she is seeking advisement from [facility], then I support that.    If the patient is going to pursue an evaluation at [facility] then she needs to contact the [facility] and follow the instructions given her by the personnel who advised new patient encounters.

## 2024-07-17 NOTE — TELEPHONE ENCOUNTER
Result note reviewed, allow patient time to see Dr. Wyatt's note.  Copied from [facility] site:    Anyone may request an appointment at [facility], and in most cases, a physician's referral is not required. When you call or submit an online appointment request, [facility] appointment staff will advise you if a referral is needed.

## 2024-07-17 NOTE — TELEPHONE ENCOUNTER
Called patient in regards to message below ( identified name and date of birth )       1.Patient states she had renal calculi in February;   Asking if she can she have a repeat BMP  to check the kidney status now  Lab order  pended for your review, completion and approval     2. Patient has call out to MRI to obtain her disc  3.Patient states [facility] needs the paper report from each of the MRI's , advised patient to obtain fax number from [facility] and then call us back so MRI can be sent       Please advise and thank you.

## 2024-07-17 NOTE — TELEPHONE ENCOUNTER
Order has been signed on the chart for repeat BMP.  Once the patient provides fax number, the [facility] staff can send the MRI reports.  Thank you.

## 2024-07-17 NOTE — TELEPHONE ENCOUNTER
Verified name and .    Patient was advised of Dr. Wyatt's message.     She states that she will call back with the fax number for [facility].

## 2024-07-17 NOTE — TELEPHONE ENCOUNTER
Patient called, verified name/.   Seeking Dr Wyatt's review of MRI results.  Please advise.  Wants to go to [facility] in [facility].  States she does not need referral per se, but they have a patient filtering process where they need to review test results.  Patient does not have a [facility] provider in mind, or a fax number.  She stated \"Dr Wyatt would handle that.\"  Please advise?  She is having a lot of back pain.  States her sister has used nabumetone (NSAID) with good success.  Patient asking if Dr Wyatt can prescribe nabumetone for her? (Pended as 500 mg).

## 2024-07-19 NOTE — TELEPHONE ENCOUNTER
Written by Luca Wyatt DO on 7/17/2024  3:25 PM CDT  Seen by patient Rosette Lou on 7/17/2024  7:10 PM  Seen by proxy Coleen Bowers on 7/17/2024  7:44 PM    Follow up call placed to patient.  Left message to call back to inquire if she plans on contacting [facility] or if she would like an internal referral.

## 2024-07-23 ENCOUNTER — TELEPHONE (OUTPATIENT)
Dept: FAMILY MEDICINE CLINIC | Facility: CLINIC | Age: 69
End: 2024-07-23

## 2024-07-23 NOTE — TELEPHONE ENCOUNTER
Patient stated that [facility] sent a request to Dr Wyatt to request MRI films to be sent to them. They already have the results but need the imaging on a disk sent to them so she can make an appointment. Patient calling to follow up to see if fax received and forwarded to medical records? Mira walton.     Clinical staff: was fax received?

## 2024-07-23 NOTE — TELEPHONE ENCOUNTER
Have not received any request, patient given medical records number to call if it was sent there and ask about getting copies of her MRI disc.

## 2024-10-16 ENCOUNTER — TELEPHONE (OUTPATIENT)
Dept: ENDOCRINOLOGY CLINIC | Facility: CLINIC | Age: 69
End: 2024-10-16

## 2024-10-16 ENCOUNTER — OFFICE VISIT (OUTPATIENT)
Dept: ENDOCRINOLOGY CLINIC | Facility: CLINIC | Age: 69
End: 2024-10-16
Payer: COMMERCIAL

## 2024-10-16 VITALS
SYSTOLIC BLOOD PRESSURE: 108 MMHG | HEART RATE: 76 BPM | DIASTOLIC BLOOD PRESSURE: 72 MMHG | HEIGHT: 61 IN | BODY MASS INDEX: 22.47 KG/M2 | WEIGHT: 119 LBS

## 2024-10-16 DIAGNOSIS — M81.0 AGE-RELATED OSTEOPOROSIS WITHOUT CURRENT PATHOLOGICAL FRACTURE: ICD-10-CM

## 2024-10-16 DIAGNOSIS — E04.1 THYROID NODULE: ICD-10-CM

## 2024-10-16 DIAGNOSIS — E55.9 VITAMIN D DEFICIENCY: Primary | ICD-10-CM

## 2024-10-16 PROCEDURE — 3078F DIAST BP <80 MM HG: CPT | Performed by: INTERNAL MEDICINE

## 2024-10-16 PROCEDURE — 3074F SYST BP LT 130 MM HG: CPT | Performed by: INTERNAL MEDICINE

## 2024-10-16 PROCEDURE — 3008F BODY MASS INDEX DOCD: CPT | Performed by: INTERNAL MEDICINE

## 2024-10-16 PROCEDURE — 99244 OFF/OP CNSLTJ NEW/EST MOD 40: CPT | Performed by: INTERNAL MEDICINE

## 2024-10-16 NOTE — TELEPHONE ENCOUNTER
Patient gave verbal permission during vist  Please get records for last DXA from   [facility]       [address]       [address],       [place] [address]

## 2024-10-16 NOTE — H&P
New Patient Evaluation - History and Physical    CONSULT - Reason for Visit:  Osteoporosis   Requesting Physician: Dr Wyatt    CHIEF COMPLAINT:    Chief Complaint   Patient presents with    Consult     Patient is in to establish care due to Osteoporosis. Patient reports no recent falls/fractures.         HISTORY OF PRESENT ILLNESS:   Rosette Lou is a 69 year old female who presents for evaluation of osteoporosis    R Hip fracture in 2006 after a fall from standing height   She was told she has osteoporosis at that time  She had a hip replacement at [facility]   Did not start Osteoporosis medication at that time    In March 2008, she missed a step and fractured her tibia and fibula  She underwent surgery     Again in 2008: she developed compression fracture T 8 ( just by sneezing hard)    2009: Fractured R lat fem condyle, she underwent a R knee replacement    Broken toe- smashed against chair.     New L2 compression fx in 2020- had MRI with Dr Abernathy Fall of 2020 after coughing    Compression fx in June 2021 T6. Refractured it in July 2021- doesn't know how she did it.     She had some fractures since then that were seen on imaging   Last MRI July 2024  Partially imaged acute or subacute moderate inferior endplate compression fracture at T12,  Chronic L1-L3 vertebral body compression fractures.      TREATMENT SO FAR :    Completed Forteo 3718-7823 - total of 24 months.   After Forteo:   Saw Dr. Day- he recommended Prolia. Pt decided she couldn't/wouldn't do it.   [facility] offered her a study- she declined  Had been doing \"Save our Bones\" program on her own      Then she started evenity in 2022  completed a year .     DXA available for review:   Had BMD at [facility]: 10/12  T score hip -3.1  T score spine -1.8    BMD [facility] 9/28/20  Spine -3.0, stable  L fem neck -3.4, (decreased 3.4%)- stable  L total hip -3.4, decreased 10.2%      Calcium intake: OPC 3 , MV ( Vit D 3 25 mcg , calcium 50 mg ) daily , D3  with K2 125 mcg daily of vit D, Calcium supplement from [employer] 1 capsule in water daily   Exercise: not a lot bc afraid to. Has done PT.     Etoh intake-very little, a glass of wine/year  Parental hip fx- none  Steroid use - briefly with herniated disc 1998; injection in knee 2008  Other known secondary causes of bone loss- extensive w/u that has been negative to date.  ( PTH, celiac testing , Vit D has been okay, Ur calcium okay --> did have one episode of renal stone)   No h/o radiation  Renal stones: yes  No h/o CAD / CVA      MNG      Last US 9/16- stable MNG with subcm nodules  R sup 0.33 cm hypo   R mid 0.49 cm hypo  L mid 0.68 cm mixed nodule    No compressive symptoms  Personal or Family history of thyroid cancer: denies  Personal or family history of MEN: denies   Exposure to head and neck radiation before age 21: denies             PAST MEDICAL HISTORY:   Past Medical History:    Chicken pox    Hip fracture, right (HCC)    Measles    Miscarriage (HCC)    x2    Mumps    Musculoskeletal disorder    Tibial, fibula, pleteau fracture    Osteoporosis    Management:  drug therapy    Osteoporosis screening    Per [employer]       PAST SURGICAL HISTORY:   Past Surgical History:   Procedure Laterality Date    D & c      after two miscarriages    Knee replacement surgery Right     Musculoskeletal surgery unlisted Right     Repair (of right hip fracture).    Musculoskeletal surgery unlisted      Repair ( of femur fracture)    Other surgical history         CURRENT MEDICATIONS:    Current Outpatient Medications   Medication Sig Dispense Refill    diazePAM 2 MG Oral Tab Patient to take 1 to 2 tablets 15 minutes prior to MRI.  Patient can take 1 additional tablet every 30 minutes as needed throughout the MRI procedure.  Thank you. 10 tablet 0    vancomycin HCl 250 MG Oral Cap Take 1 capsule (250 mg total) by mouth 4 (four) times daily. 56 capsule 0    B Complex Vitamins (VITAMIN-B COMPLEX) Oral Tab Take 1 tablet by  mouth daily.      Calcium Carbonate 600 MG Oral Tab Take 1 tablet (600 mg total) by mouth 2 (two) times daily. Take 1 1/2 tablet BID      Multiple Vitamin (MULTI-VITAMIN) Oral Tab Take 1 tablet by mouth daily.      HYDROcodone-acetaminophen 5-325 MG Oral Tab Take 1-2 tablets by mouth 2 (two) times daily as needed for Pain. 10 tablet 0       ALLERGIES:  Allergies[1]    SOCIAL HISTORY:    Social History     Socioeconomic History    Marital status:    Tobacco Use    Smoking status: Never    Smokeless tobacco: Never   Substance and Sexual Activity    Alcohol use: Yes     Alcohol/week: 0.0 standard drinks of alcohol     Comment: Rarely.    Drug use: No   Other Topics Concern    Caffeine Concern Yes     Comment: Coffee, 1 cup daily.       FAMILY HISTORY:   Family History   Problem Relation Age of Onset    Breast Cancer Maternal Aunt         (2 aunts; one is still living and one is .    Dementia Maternal Grandmother         Alzheimer's    Heart Disease Father     Hypertension Father     Kidney Disease Sister        ASSESSMENTS:     REVIEW OF SYSTEMS:  Constitutional: Negative for: weight change, fever, fatigue, cold/heat intolerance  Eyes: Negative for:  Visual changes, proptosis, blurring  ENT: Negative for:  dysphagia, neck swelling, dysphonia  Respiratory: Negative for:  dyspnea, cough  Cardiovascular: Negative for:  chest pain, palpitations, orthopnea  GI: Negative for:  abdominal pain, nausea, vomiting, diarrhea, constipation, bleeding  Neurology: Negative for: headache, numbness, weakness  Genito-Urinary: Negative for: dysuria, frequency  Psychiatric: Negative for:  depression, anxiety  Hematology/Lymphatics: Negative for: bruising, lower extremity edema  Endocrine: Negative for: polyuria, polydypsia  Skin: Negative for: rash, blister, cellulitis,      PHYSICAL EXAM:   Vitals:    10/16/24 1218   BP: 108/72   Pulse: 76   Weight: 119 lb (54 kg)   Height: 5' 1\" (1.549 m)     BMI: Body mass index is 22.48  kg/m².         General Appearance:  alert, well developed, in no acute distress  Head: Atraumatic  Eyes:  normal conjunctivae, sclera., normal sclera and normal pupils  Throat/Neck: normal sound to voice. Normal hearing, normal speech.  No significant thyroid enlargement palpated  Respiratory:  Speaking in full sentences, non-labored. no increased work of breathing, no audible wheezing    Neuro: motor grossly intact, moving all extremities without difficulty  Psychiatric:  oriented to time, self, and place  Extremities: no obvious extremity swelling        DATA:     Pertinent data reviewed      ASSESSMENT AND PLAN:      Patient is a 69 year old female with :     Multiple thyroid nodules  -Discussed common occurrence of thyroid nodules in the population approx 50-60% of the population  -Discussed risk of malignancy is approx 3-5%, 95-97% of nodules are benign  -Discussed recommendation to perform FNA biopsy of nodules greater than 1cm in size  -Discussed that if nodule is benign then plan to follow with yearly thyroid ultrasound    Will repeat US    2. Osteoporosis  Discussed Osteoporosis is detail including pathogenesis, risk factors and treatment options.   We discussed various treatment options, including anabolic vs resorptive.      Patient has a very extensive history of fragility fractures.  She has been on Forteo for 2 years and Evenity for 1 year in the past.  She has not followed these with antiresorptive therapies due to concerns about side effects.  She completed treatment with Evenity in 2023.  Patient is not sure if she had a bone density test done in 2023.  She would like to redo a DXA scan before she makes any plans about further treatment.  I discussed that typically insurances cover bone density test every 2 years.  She will like to check with her insurance.  Order has been provided.  We will also complete some blood work and urine testing as below.  Given her complicated medical history, I think she  would benefit from consultation with Dr. Olamide Harrison.  I have provided referral to the patient.  Also have patient book an appointment with Dr. Harrison during this appointment    I discussed the possibility of trying Prolia followed by [facility] or Reclast only  Patient remains hesitant regarding side effects.  She will complete DXA scan and labs as below.  Further management will be based on these results  - Discussed dietary intake of calcium  -Exercise as tolerated  - Fall precautions.       Orders Placed This Encounter   Procedures    Comp Metabolic Panel [E]    Alk Phosphatase, Bone Specific    PTH, Intact    Vitamin D [E]    Tissue Transglutaminase Ab, IgA    Monoclonal Protein Study    Bence Webster protein, 24 Hour Urine Panel    Calcium, 24Hr Urine    Creatinine, 24-Hour Urine         10/16/2024  Deisy Martin MD        Patient verbalized a complete  understanding of all of the above and did not have any further questions.       A total of 60 minutes was spent on obtaining history, reviewing pertinent labs, evaluating patient, providing multiple treatment options, reinforcing diet/exercise and compliance, and completing documentation.            [1]   Allergies  Allergen Reactions    Clindamycin PAIN     abd pain and diarrhea, c-diff    Acyclovir      Other reaction(s): Rash    Cephalexin HIVES     Other reaction(s): Other  \"Sensitivity.\"    Morphine      Other reaction(s): Discomfort    Sulfanilamide      Other reaction(s): Discomfort

## 2024-10-17 NOTE — TELEPHONE ENCOUNTER
Spoke to Karina at [facility] (phone #[phone]) to request copy of dexa scan - Karina will check records and fax any records to OB

## 2024-10-17 NOTE — TELEPHONE ENCOUNTER
Received fax from [facility] with Dex scan report dated on 3/28/2023. Placed in providers folder for review.

## 2024-10-24 ENCOUNTER — APPOINTMENT (OUTPATIENT)
Dept: LAB | Facility: HOSPITAL | Age: 69
End: 2024-10-24
Attending: INTERNAL MEDICINE
Payer: COMMERCIAL

## 2024-10-24 PROCEDURE — 84166 PROTEIN E-PHORESIS/URINE/CSF: CPT

## 2024-10-24 PROCEDURE — 86335 IMMUNFIX E-PHORSIS/URINE/CSF: CPT

## 2024-10-24 PROCEDURE — 84156 ASSAY OF PROTEIN URINE: CPT

## 2024-10-24 PROCEDURE — 82570 ASSAY OF URINE CREATININE: CPT

## 2024-10-24 PROCEDURE — 82340 ASSAY OF CALCIUM IN URINE: CPT

## 2024-10-25 ENCOUNTER — LAB ENCOUNTER (OUTPATIENT)
Dept: LAB | Facility: HOSPITAL | Age: 69
End: 2024-10-25
Attending: INTERNAL MEDICINE
Payer: COMMERCIAL

## 2024-10-25 ENCOUNTER — TELEPHONE (OUTPATIENT)
Dept: ENDOCRINOLOGY CLINIC | Facility: CLINIC | Age: 69
End: 2024-10-25

## 2024-10-25 DIAGNOSIS — R77.8 ABNORMAL SPEP: Primary | ICD-10-CM

## 2024-10-25 DIAGNOSIS — M81.0 AGE-RELATED OSTEOPOROSIS WITHOUT CURRENT PATHOLOGICAL FRACTURE: ICD-10-CM

## 2024-10-25 DIAGNOSIS — E55.9 VITAMIN D DEFICIENCY: ICD-10-CM

## 2024-10-25 LAB
ALBUMIN SERPL-MCNC: 4.7 G/DL (ref 3.2–4.8)
ALBUMIN/GLOB SERPL: 1.6 {RATIO} (ref 1–2)
ALP LIVER SERPL-CCNC: 65 U/L
ALT SERPL-CCNC: 15 U/L
ANION GAP SERPL CALC-SCNC: 5 MMOL/L (ref 0–18)
AST SERPL-CCNC: 21 U/L (ref ?–34)
BILIRUB SERPL-MCNC: 0.7 MG/DL (ref 0.2–1.1)
BUN BLD-MCNC: 14 MG/DL (ref 9–23)
BUN/CREAT SERPL: 14.4 (ref 10–20)
CALCIUM 24H UR-SRATE: 189 MG/24HR (ref 100–300)
CALCIUM BLD-MCNC: 10.2 MG/DL (ref 8.7–10.4)
CHLORIDE SERPL-SCNC: 107 MMOL/L (ref 98–112)
CO2 SERPL-SCNC: 30 MMOL/L (ref 21–32)
CREAT BLD-MCNC: 0.97 MG/DL
CREAT UR-SCNC: 0.72 G/24 HR (ref 0.6–1.8)
EGFRCR SERPLBLD CKD-EPI 2021: 64 ML/MIN/1.73M2 (ref 60–?)
FASTING STATUS PATIENT QL REPORTED: NO
GLOBULIN PLAS-MCNC: 2.9 G/DL (ref 2–3.5)
GLUCOSE BLD-MCNC: 79 MG/DL (ref 70–99)
OSMOLALITY SERPL CALC.SUM OF ELEC: 293 MOSM/KG (ref 275–295)
POTASSIUM SERPL-SCNC: 4.7 MMOL/L (ref 3.5–5.1)
PROT SERPL-MCNC: 7.6 G/DL (ref 5.7–8.2)
PTH-INTACT SERPL-MCNC: 26.2 PG/ML (ref 18.5–88)
SODIUM SERPL-SCNC: 142 MMOL/L (ref 136–145)
SPECIMEN VOL UR: 900 ML
VIT D+METAB SERPL-MCNC: 71.9 NG/ML (ref 30–100)

## 2024-10-25 PROCEDURE — 80053 COMPREHEN METABOLIC PANEL: CPT

## 2024-10-25 PROCEDURE — 83521 IG LIGHT CHAINS FREE EACH: CPT

## 2024-10-25 PROCEDURE — 36415 COLL VENOUS BLD VENIPUNCTURE: CPT

## 2024-10-25 PROCEDURE — 83970 ASSAY OF PARATHORMONE: CPT

## 2024-10-25 PROCEDURE — 82306 VITAMIN D 25 HYDROXY: CPT

## 2024-10-25 PROCEDURE — 84165 PROTEIN E-PHORESIS SERUM: CPT

## 2024-10-25 PROCEDURE — 86334 IMMUNOFIX E-PHORESIS SERUM: CPT

## 2024-10-25 PROCEDURE — 84080 ASSAY ALKALINE PHOSPHATASES: CPT

## 2024-10-25 PROCEDURE — 86364 TISS TRNSGLTMNASE EA IG CLAS: CPT

## 2024-10-25 NOTE — TELEPHONE ENCOUNTER
Received DXA results from 3/28/2023  AP spine T-score -2.9  Femur neck T-score -3.9  Femur total T-score -3.9    Patient completed DXA on 3/28/2023  She will be due for repeat DXA in April 2025  Order was provided  Compared to DXA in 2020, there has not been much improvement in bone density  I have recommended to get a consult with Dr. Olamide Harrison.  She has an appointment in December.  [email or web address] message sent to patient to keep me posted.    Endo staff: Please call patient with [email or web address] message if not read by 11/1/2024.  Thanks.

## 2024-10-29 LAB
ALBUMIN SERPL ELPH-MCNC: 4.36 G/DL (ref 3.75–5.21)
ALBUMIN/GLOB SERPL: 1.48 {RATIO} (ref 1–2)
ALKALINE PHOSPHATASE BONE SPECIFIC: 12.1 UG/L
ALPHA1 GLOB SERPL ELPH-MCNC: 0.26 G/DL (ref 0.19–0.46)
ALPHA2 GLOB SERPL ELPH-MCNC: 0.73 G/DL (ref 0.48–1.05)
B-GLOBULIN SERPL ELPH-MCNC: 0.73 G/DL (ref 0.68–1.23)
GAMMA GLOB SERPL ELPH-MCNC: 1.22 G/DL (ref 0.62–1.7)
KAPPA LC FREE SER-MCNC: 2.31 MG/DL (ref 0.33–1.94)
KAPPA LC FREE/LAMBDA FREE SER NEPH: 1.78 {RATIO} (ref 0.26–1.65)
LAMBDA LC FREE SERPL-MCNC: 1.3 MG/DL (ref 0.57–2.63)
PROT SERPL-MCNC: 7.3 G/DL (ref 5.7–8.2)

## 2024-10-30 LAB — TTG IGA SER-ACNC: 0.4 U/ML (ref ?–7)

## 2024-11-04 ENCOUNTER — TELEPHONE (OUTPATIENT)
Dept: ENDOCRINOLOGY CLINIC | Facility: CLINIC | Age: 69
End: 2024-11-04

## 2024-11-04 ENCOUNTER — TELEPHONE (OUTPATIENT)
Age: 69
End: 2024-11-04

## 2024-11-04 NOTE — TELEPHONE ENCOUNTER
New Consult   [phone]   Carmine Dinero  : 1955  Ph: [phone]  Dr.Manchanda Olivas  to       [facility]  hem / onc  Dx Abnormal  Spep  Thanks Eloise

## 2024-11-04 NOTE — TELEPHONE ENCOUNTER
Patient requesting to speak with Endocrinologist regarding lab results.  Please call.  Thank you.

## 2024-11-05 NOTE — TELEPHONE ENCOUNTER
Spoke with patient (verified name and ), advised Dr Cifuentes Hiss note and verbalized understanding. Note      She may, especially due to the recent antibiotic use. She would not be able to give sample at office. We have patients do that at home.    Sh none

## 2024-11-06 ENCOUNTER — OFFICE VISIT (OUTPATIENT)
Dept: HEMATOLOGY/ONCOLOGY | Facility: HOSPITAL | Age: 69
End: 2024-11-06
Attending: INTERNAL MEDICINE
Payer: COMMERCIAL

## 2024-11-06 VITALS
HEIGHT: 59.84 IN | HEART RATE: 77 BPM | RESPIRATION RATE: 16 BRPM | WEIGHT: 119.19 LBS | OXYGEN SATURATION: 98 % | TEMPERATURE: 98 F | DIASTOLIC BLOOD PRESSURE: 67 MMHG | BODY MASS INDEX: 23.4 KG/M2 | SYSTOLIC BLOOD PRESSURE: 114 MMHG

## 2024-11-06 DIAGNOSIS — N18.2 CKD (CHRONIC KIDNEY DISEASE) STAGE 2, GFR 60-89 ML/MIN: ICD-10-CM

## 2024-11-06 DIAGNOSIS — R76.8 ELEVATED SERUM IMMUNOGLOBULIN FREE LIGHT CHAINS: Primary | ICD-10-CM

## 2024-11-06 DIAGNOSIS — M80.80XS OTHER OSTEOPOROSIS WITH CURRENT PATHOLOGICAL FRACTURE, SEQUELA: ICD-10-CM

## 2024-11-06 LAB
BASOPHILS # BLD AUTO: 0.05 X10(3) UL (ref 0–0.2)
BASOPHILS NFR BLD AUTO: 0.6 %
DEPRECATED RDW RBC AUTO: 43.1 FL (ref 35.1–46.3)
EOSINOPHIL # BLD AUTO: 0.07 X10(3) UL (ref 0–0.7)
EOSINOPHIL NFR BLD AUTO: 0.8 %
ERYTHROCYTE [DISTWIDTH] IN BLOOD BY AUTOMATED COUNT: 12.4 % (ref 11–15)
HCT VFR BLD AUTO: 41 %
HGB BLD-MCNC: 14.4 G/DL
IMM GRANULOCYTES # BLD AUTO: 0.03 X10(3) UL (ref 0–1)
IMM GRANULOCYTES NFR BLD: 0.4 %
LDH SERPL L TO P-CCNC: 158 U/L
LYMPHOCYTES # BLD AUTO: 1.81 X10(3) UL (ref 1–4)
LYMPHOCYTES NFR BLD AUTO: 21.9 %
MCH RBC QN AUTO: 32.8 PG (ref 26–34)
MCHC RBC AUTO-ENTMCNC: 35.1 G/DL (ref 31–37)
MCV RBC AUTO: 93.4 FL
MONOCYTES # BLD AUTO: 0.54 X10(3) UL (ref 0.1–1)
MONOCYTES NFR BLD AUTO: 6.5 %
NEUTROPHILS # BLD AUTO: 5.77 X10 (3) UL (ref 1.5–7.7)
NEUTROPHILS # BLD AUTO: 5.77 X10(3) UL (ref 1.5–7.7)
NEUTROPHILS NFR BLD AUTO: 69.8 %
PLATELET # BLD AUTO: 249 10(3)UL (ref 150–450)
RBC # BLD AUTO: 4.39 X10(6)UL
WBC # BLD AUTO: 8.3 X10(3) UL (ref 4–11)

## 2024-11-06 PROCEDURE — 99244 OFF/OP CNSLTJ NEW/EST MOD 40: CPT | Performed by: INTERNAL MEDICINE

## 2024-11-06 NOTE — CONSULTS
[facility] Hematology  Consultation Note    Patient Name: Rosette Lou   YOB: 1955   Medical Record Number: [id number]   CSN: [id number]   Consulting Physician: Gabriel Boothe MD  Referring Physician(s): Dr Deisy Martin MD  Date of Consultation: 11/6/2024     Reason for Consultation:    1. Elevated serum immunoglobulin free light chains    2. Other osteoporosis with current pathological fracture, sequela    3. CKD (chronic kidney disease) stage 2, GFR 60-89 ml/min      History of Present Illness:   Rosette Lou is a 69 year old female seen today in the Hematology Clinic  for elevated kappa free light chains.     The patient has seen endocrinology for evaluation of osteoporosis.  She has had several fractures either spontaneously or with trivial trauma dating back to 2006.  She has had several compression fractures of the thoracic and lumbar spine in the last 3 years or so.  Previously been on Forteo in 2012.    She saw endocrinology for this and as part of the workup had serum protein electrophoresis that did not demonstrate any evidence of monoclonal paraproteins. [place] free light chain was minimally elevated 2.3 mg/dL.  Kappa lambda ratio was slightly at 1.78.  Her creatinine was at the upper limit of normal at 0.97.  eGFR was 64. Calcium normal at 10.2    24-hour urine protein electropheresis showed no Bence-Webster proteinuria. Last CBC from 2/7/24 was normal.  She does appear to have mild stage 2 CKD     She states that She has not been told about problems with blood counts before. She  has not received blood transfusion before. He has noted no swollen glands in neck, axillary or inguinal areas, There is no family history of any hematologic neoplasm. She denies any  hematuria, melana or BRBPR.      Past Medical History:  Past Medical History:    Chicken pox    Hip fracture, right (HCC)    Measles    Miscarriage (HCC)    x2    Mumps    Musculoskeletal disorder    Tibial, fibula,  pleteau fracture    Osteoporosis    Management:  drug therapy    Osteoporosis screening    Per [employer]       Past Surgical History:  Past Surgical History:   Procedure Laterality Date    D & c      after two miscarriages    Knee replacement surgery Right     Musculoskeletal surgery unlisted Right     Repair (of right hip fracture).    Musculoskeletal surgery unlisted      Repair ( of femur fracture)    Other surgical history         Family Medical History:  Family History   Problem Relation Age of Onset    Breast Cancer Maternal Aunt         (2 aunts; one is still living and one is .    Dementia Maternal Grandmother         Alzheimer's    Heart Disease Father     Hypertension Father     Kidney Disease Sister        Gyne History:  OB History   No obstetric history on file.       Social History:  Social History     Socioeconomic History    Marital status:      Spouse name: Not on file    Number of children: Not on file    Years of education: Not on file    Highest education level: Not on file   Occupational History    Not on file   Tobacco Use    Smoking status: Never     Passive exposure: Past    Smokeless tobacco: Never   Vaping Use    Vaping status: Never Used   Substance and Sexual Activity    Alcohol use: Yes     Alcohol/week: 0.0 standard drinks of alcohol     Comment: Rarely.    Drug use: No    Sexual activity: Not on file   Other Topics Concern     Service Not Asked    Blood Transfusions Not Asked    Caffeine Concern Yes     Comment: Coffee, 1 cup daily.    Occupational Exposure Not Asked    Hobby Hazards Not Asked    Sleep Concern Not Asked    Stress Concern Not Asked    Weight Concern Not Asked    Special Diet Not Asked    Back Care Not Asked    Exercise Not Asked    Bike Helmet Not Asked    Seat Belt Not Asked    Self-Exams Not Asked   Social History Narrative    Not on file     Social Drivers of Health     Financial Resource Strain: Not on file   Food Insecurity: Not on file    Transportation Needs: Not on file   Physical Activity: Not on file   Stress: Not on file   Social Connections: Unknown (3/13/2021)    Received [facility]    Social Connections     Conversations with friends/family/neighbors per week: Not on file   Housing Stability: Low Risk  (7/12/2021)    Received from [facility], [facility]    Housing Stability     Mortgage Payment Concerns?: Not on file     Number of Places Lived in the Last Year: Not on file     Unstable Housing?: Not on file       Allergies:   Allergies[1]    Current Medications:   Vitamin D-Vitamin K (VITAMIN K2-VITAMIN D3 OR) Take by mouth.      B Complex Vitamins (VITAMIN-B COMPLEX) Oral Tab Take 1 tablet by mouth daily.      Calcium Carbonate 600 MG Oral Tab Take 1 tablet (600 mg total) by mouth 2 (two) times daily. Take 1 1/2 tablet BID      Multiple Vitamin (MULTI-VITAMIN) Oral Tab Take 1 tablet by mouth daily.         Review of Systems:  Pertinent positives and negatives noted in the the HPI.     Physical Examination:  /67 (BP Location: Left arm, Patient Position: Sitting, Cuff Size: adult)   Pulse 77   Temp 98.1 °F (36.7 °C) (Oral)   Resp 16   Ht 1.52 m (4' 11.84\")   Wt 54.1 kg (119 lb 3.2 oz)   SpO2 98%   BMI 23.40 kg/m²     General: Patient is alert and oriented x 3, not in acute distress.  Very frail  HEENT: EOMs intact. PERRL. Oropharynx is clear.   Neck: No JVD. No palpable lymphadenopathy. Neck is supple.  Lymphatics: There is no palpable lymphadenopathy throughout in the cervical, supraclavicular, axillary, or inguinal regions.  Chest: Clear to auscultation. No wheezes or rales.  Heart: Regular rate and rhythm. S1S2 normal.  Extremities: No edema or calf tenderness.  Neurological: Grossly intact.     Labs:    Lab Results   Component Value Date/Time    WBC 7.3 09/24/2012 02:42 PM    RBC 4.22 09/24/2012 02:42 PM    HGB 13.4 09/24/2012  02:42 PM    HCT 39.8 09/24/2012 02:42 PM    MCV 94.3 09/24/2012 02:42 PM    MCH 31.7 09/24/2012 02:42 PM    MCHC 33.6 09/24/2012 02:42 PM    RDW 13.5 09/24/2012 02:42 PM     09/24/2012 02:42 PM       Impression:  Diagnosis  1. Elevated serum immunoglobulin free light chains    2. Other osteoporosis with current pathological fracture, sequela    3. CKD (chronic kidney disease) stage 2, GFR 60-89 ml/min      69-year-old female with severe osteoporosis and multiple compression fractures found to have mildly elevated serum kappa free light chains.  She does not have any monoclonal gammopathy or any evidence of hypercalcemia significantly dysfunction to suggest an underlying plasma cell dyscrasia.  Mild elevation of light chains and altered kappa lambda light chain ratio is common in patients with renal dysfunction, (her eGFR was 64) - [If renal impairment is present, use a reference range of 0.37 - 3.10 for Kappa/Lambda FLC ratio]   2.    Will check CBC and LDH    3.  If no significant cytopenias are noted  she does not require any further hematologic workup      Thank you Dr Deisy Martin for the opportunity to participate in the care of this interesting patient. Please do contact me if I may be of any further assistance    Gabriel Boothe MD  [facility] Hematology/Oncology    Copy to: Dr. Luca Wyatt, DO         [1]   Allergies  Allergen Reactions    Clindamycin PAIN     abd pain and diarrhea, c-diff    Acyclovir      Other reaction(s): Rash    Cephalexin HIVES     Other reaction(s): Other  \"Sensitivity.\"    Morphine      Other reaction(s): Discomfort    Sulfanilamide      Other reaction(s): Discomfort

## 2024-11-06 NOTE — TELEPHONE ENCOUNTER
See TE 10/25- Dr Martin responded to pt via [facility] and pt has read the message:    Last read by Rosette Lou at  2:47 PM on 11/4/2024.   Last read by Coleen Bowers at  5:09 PM on 11/4/2024.

## 2024-11-07 ENCOUNTER — HOSPITAL ENCOUNTER (OUTPATIENT)
Dept: ULTRASOUND IMAGING | Facility: HOSPITAL | Age: 69
Discharge: HOME | End: 2024-11-07
Attending: INTERNAL MEDICINE
Payer: COMMERCIAL

## 2024-11-07 DIAGNOSIS — E04.1 THYROID NODULE: ICD-10-CM

## 2024-11-07 PROCEDURE — 76536 US EXAM OF HEAD AND NECK: CPT | Performed by: INTERNAL MEDICINE

## 2024-11-13 ENCOUNTER — PATIENT MESSAGE (OUTPATIENT)
Dept: ENDOCRINOLOGY CLINIC | Facility: CLINIC | Age: 69
End: 2024-11-13

## 2024-12-02 ENCOUNTER — HOSPITAL ENCOUNTER (OUTPATIENT)
Dept: BONE DENSITY | Facility: HOSPITAL | Age: 69
Discharge: HOME | End: 2024-12-02
Attending: INTERNAL MEDICINE
Payer: COMMERCIAL

## 2024-12-02 DIAGNOSIS — M81.0 AGE-RELATED OSTEOPOROSIS WITHOUT CURRENT PATHOLOGICAL FRACTURE: ICD-10-CM

## 2024-12-02 PROCEDURE — 77080 DXA BONE DENSITY AXIAL: CPT | Performed by: INTERNAL MEDICINE

## 2025-06-11 ENCOUNTER — APPOINTMENT (OUTPATIENT)
Dept: GENERAL RADIOLOGY | Age: 70
End: 2025-06-11
Attending: PHYSICIAN ASSISTANT
Payer: COMMERCIAL

## 2025-06-11 ENCOUNTER — NURSE TRIAGE (OUTPATIENT)
Dept: FAMILY MEDICINE CLINIC | Facility: CLINIC | Age: 70
End: 2025-06-11

## 2025-06-11 ENCOUNTER — HOSPITAL ENCOUNTER (OUTPATIENT)
Age: 70
Discharge: HOME | End: 2025-06-11
Payer: COMMERCIAL

## 2025-06-11 VITALS
TEMPERATURE: 97 F | HEART RATE: 95 BPM | RESPIRATION RATE: 20 BRPM | OXYGEN SATURATION: 97 % | DIASTOLIC BLOOD PRESSURE: 73 MMHG | SYSTOLIC BLOOD PRESSURE: 106 MMHG

## 2025-06-11 DIAGNOSIS — S99.911A INJURY OF RIGHT ANKLE, INITIAL ENCOUNTER: Primary | ICD-10-CM

## 2025-06-11 DIAGNOSIS — S93.401A SPRAIN OF RIGHT ANKLE, UNSPECIFIED LIGAMENT, INITIAL ENCOUNTER: ICD-10-CM

## 2025-06-11 PROCEDURE — 73630 X-RAY EXAM OF FOOT: CPT | Performed by: PHYSICIAN ASSISTANT

## 2025-06-11 PROCEDURE — L4350 ANKLE CONTROL ORTHO PRE OTS: HCPCS | Performed by: PHYSICIAN ASSISTANT

## 2025-06-11 PROCEDURE — 73610 X-RAY EXAM OF ANKLE: CPT | Performed by: PHYSICIAN ASSISTANT

## 2025-06-11 PROCEDURE — A6449 LT COMPRES BAND >=3" <5"/YD: HCPCS | Performed by: PHYSICIAN ASSISTANT

## 2025-06-11 PROCEDURE — 99203 OFFICE O/P NEW LOW 30 MIN: CPT | Performed by: PHYSICIAN ASSISTANT

## 2025-06-11 NOTE — ED PROVIDER NOTES
Patient Seen in: [facility]        History  Chief Complaint   Patient presents with    Foot Pain     Stated Complaint: Foot Injury    Subjective:   HPI          Rosette Lou is 69 year old female presents with acute right foot/ right ankle pain x 1 day due to inversion injury while ambulating over an uneven surface.  Patient reports pain localized to dorsum of right foot.  Non radiating, worsened w/ ambulation, weight bearing, and palpation.  No numbness, tingling, parasthesias, skin/ color/ temperature/ sensory changes reported.  No medications taken prior to arrival.             Objective:     Past Medical History:    Chicken pox    Hip fracture, right (HCC)    Measles    Miscarriage (HCC)    x2    Mumps    Musculoskeletal disorder    Tibial, fibula, pleteau fracture    Osteoporosis    Management:  drug therapy    Osteoporosis screening    Per [employer]              Past Surgical History:   Procedure Laterality Date    D & c      after two miscarriages    Knee replacement surgery Right     Musculoskeletal surgery unlisted Right     Repair (of right hip fracture).    Musculoskeletal surgery unlisted      Repair ( of femur fracture)    Other surgical history                  Social History     Socioeconomic History    Marital status:    Tobacco Use    Smoking status: Never     Passive exposure: Past    Smokeless tobacco: Never   Vaping Use    Vaping status: Never Used   Substance and Sexual Activity    Alcohol use: Yes     Alcohol/week: 0.0 standard drinks of alcohol     Comment: Rarely.    Drug use: No   Other Topics Concern    Caffeine Concern Yes     Comment: Coffee, 1 cup daily.     Social Drivers of Health     Food Insecurity: No Food Insecurity (3/5/2025)    Received from [facility]    Hunger Vital Sign     Worried About Running Out of Food in the Last Year: Never true     Ran Out of Food in the Last Year: Never true   Transportation Needs: No Transportation  Needs (3/5/2025)    Received from [facility]    PRAPARE - Transportation     Lack of Transportation (Medical): No     Lack of Transportation (Non-Medical): No   Housing Stability: Unknown (3/5/2025)    Received from [facility]    Housing Stability Vital Sign     Unable to Pay for Housing in the Last Year: No              Review of Systems   All other systems reviewed and are negative.      Positive for stated complaint: Foot Injury  Other systems are as noted in HPI.  Constitutional and vital signs reviewed.      All other systems reviewed and negative except as noted above.                  Physical Exam    ED Triage Vitals [06/11/25 1632]   /73   Pulse 95   Resp 20   Temp 97.4 °F (36.3 °C)   Temp src Oral   SpO2 97 %   O2 Device None (Room air)       Current Vitals:   Vital Signs  BP: 106/73  Pulse: 95  Resp: 20  Temp: 97.4 °F (36.3 °C)  Temp src: Oral    Oxygen Therapy  SpO2: 97 %  O2 Device: None (Room air)            Physical Exam  Vitals and nursing note reviewed.   Constitutional:       General: She is not in acute distress.     Appearance: Normal appearance. She is normal weight. She is not ill-appearing, toxic-appearing or diaphoretic.   HENT:      Head: Normocephalic and atraumatic.      Right Ear: External ear normal.      Left Ear: External ear normal.      Nose: Nose normal.   Eyes:      Extraocular Movements: Extraocular movements intact.      Conjunctiva/sclera: Conjunctivae normal.      Pupils: Pupils are equal, round, and reactive to light.   Pulmonary:      Effort: Pulmonary effort is normal. No respiratory distress.   Musculoskeletal:         General: Swelling, tenderness and signs of injury present. No deformity. Normal range of motion.      Cervical back: Normal range of motion and neck supple.      Comments: Tenderness to palpation over dorsum of right foot with associated soft tissue swelling otherwise capillary refill less than 2 seconds, DP/  PT pulses intact 2+ bilaterally      Skin:     General: Skin is warm and dry.      Capillary Refill: Capillary refill takes less than 2 seconds.      Coloration: Skin is not jaundiced or pale.      Findings: Bruising present. No erythema, lesion or rash.   Neurological:      General: No focal deficit present.      Mental Status: She is alert and oriented to person, place, and time. Mental status is at baseline.   Psychiatric:         Mood and Affect: Mood normal.         Behavior: Behavior normal.                 ED Course  Labs Reviewed - No data to display  XR ANKLE (MIN 3 VIEWS), RIGHT (CPT=73610)   Final Result   PROCEDURE: XR ANKLE (MIN 3 VIEWS), RIGHT (CPT=73610)       COMPARISON: None.       INDICATIONS: Right lateral foot pain post fall yesterday.       Findings and impression:       Normal alignment with no fracture   Finalized by (CST): Kirk Johnson MD on 6/11/2025 at 5:00 PM                        XR FOOT, COMPLETE (MIN 3 VIEWS), RIGHT (CPT=73630)   Final Result   PROCEDURE: XR FOOT, COMPLETE (MIN 3 VIEWS), RIGHT (CPT=73630)       COMPARISON: None.       INDICATIONS: Right lateral foot pain post fall yesterday.       TECHNIQUE: 3 views were obtained.                         =====   CONCLUSION:    1. No acute fracture or subluxation.     2. 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               Dictated by (CST): Efrain Rajput MD on 6/11/2025 at 4:56 PM        Finalized by (CST): Efrain Rajput MD on 6/11/2025 at 4:57 PM                                        [facility]          Medical Decision Making  69 year old well appearing female presents with acute right foot/ right ankle pain x 1 day due to inversion injury while ambulating over an uneven surface.  Considerations include but not limited to fracture vs dislocation     Plan  - x ray: right foot/ right ankle  - ACE wrap/ ankle splint  - OTC: Tylenol 1g po q 6 hours/ prn.  ibuprofen 600mg po q8 hours/ prn.     - RICE Apply a compressive ACE  bandage. Rest and elevate the affected painful area. Apply cold compresses intermittently as needed. As pain recedes, begin normal activities slowly as tolerated. Call if symptoms persist.  - explained to patient that if symptoms do not improve that an MRI may be warranted however that will be determined at the discretion of follow up care  - refer to orthopaedics/ PCP/ podiatry as needed       Amount and/or Complexity of Data Reviewed  Radiology: ordered and independent interpretation performed. Decision-making details documented in ED Course.     Details: There is no fracture of right ankle/ right foot based on my independent interpretation         Disposition and Plan     Clinical Impression:  1. Injury of right ankle, initial encounter    2. Sprain of right ankle, unspecified ligament, initial encounter         Disposition:  Discharge  6/11/2025  5:23 pm    Follow-up:  Luca Wyatt, DO  [address] [address]  [phone]          [facility] [address] [address]  [phone]              Medications Prescribed:  Current Discharge Medication List                Supplementary Documentation:

## 2025-06-11 NOTE — DISCHARGE INSTRUCTIONS
To schedule an appointment with the Orthopedic and Sports Medicine department; please text or call [phone] and choose option 3 when prompted. If your insurance requires a referral, please contact your primary care provider first.

## 2025-06-11 NOTE — TELEPHONE ENCOUNTER
Action Requested: Summary for Provider     []  Critical Lab, Recommendations Needed  [] Need Additional Advice  []   FYI    []   Need Orders  [] Need Medications Sent to Pharmacy  []  Other     SUMMARY: Per protocol advised :  Go to ER or IC     Patient choosing to go to [place] [facility]      Reason for call: Foot Injury  Onset: Data Unavailable    Patient calling ( name and date of birth of patient verified ) reports she stepped in a hole in the yard yesterday , heard a POP , right ankle / top of foot is affected     Foot is bruised, has edema, could weight bear yesterday but not able to weight bear today  She did apply ice, elevated the leg, applied an ace wrap   Took Advil and Tylenol but pain is increasing    ( Hx of Osteoporosis )     Advised ER or IC     Patient lives in [place]  , prefers IC     Provided locations/ hours and wait times for [facility]     will drive her now        Reason for Disposition   Can't stand (bear weight) or walk (e.g., 4 steps)    Protocols used: Ankle and Foot Injury-A-[place]

## 2025-08-29 ENCOUNTER — NURSE TRIAGE (OUTPATIENT)
Dept: FAMILY MEDICINE CLINIC | Facility: CLINIC | Age: 70
End: 2025-08-29

## 2025-08-29 ENCOUNTER — HOSPITAL ENCOUNTER (EMERGENCY)
Facility: HOSPITAL | Age: 70
Discharge: HOME | End: 2025-08-29
Attending: EMERGENCY MEDICINE

## 2025-08-29 ENCOUNTER — APPOINTMENT (OUTPATIENT)
Dept: GENERAL RADIOLOGY | Facility: HOSPITAL | Age: 70
End: 2025-08-29

## 2025-08-29 VITALS
HEART RATE: 80 BPM | TEMPERATURE: 99 F | DIASTOLIC BLOOD PRESSURE: 78 MMHG | HEIGHT: 60 IN | RESPIRATION RATE: 18 BRPM | OXYGEN SATURATION: 100 % | SYSTOLIC BLOOD PRESSURE: 114 MMHG | BODY MASS INDEX: 23 KG/M2

## 2025-08-29 DIAGNOSIS — R07.89 CHEST PAIN, ATYPICAL: ICD-10-CM

## 2025-08-29 DIAGNOSIS — R00.2 PALPITATIONS: Primary | ICD-10-CM

## 2025-08-29 LAB
ALBUMIN SERPL-MCNC: 4.8 G/DL (ref 3.2–4.8)
ALBUMIN/GLOB SERPL: 1.5 (ref 1–2)
ALP LIVER SERPL-CCNC: 41 U/L (ref 55–142)
ALT SERPL-CCNC: 14 U/L (ref 10–49)
ANION GAP SERPL CALC-SCNC: 7 MMOL/L (ref 0–18)
AST SERPL-CCNC: 31 U/L (ref ?–34)
ATRIAL RATE: 82 BPM
BASOPHILS # BLD AUTO: 0.04 X10(3) UL (ref 0–0.2)
BASOPHILS NFR BLD AUTO: 0.5 %
BILIRUB SERPL-MCNC: 0.5 MG/DL (ref 0.2–1.1)
BUN BLD-MCNC: 12 MG/DL (ref 9–23)
BUN/CREAT SERPL: 11.5 (ref 10–20)
CALCIUM BLD-MCNC: 10.1 MG/DL (ref 8.7–10.4)
CHLORIDE SERPL-SCNC: 105 MMOL/L (ref 98–112)
CO2 SERPL-SCNC: 28 MMOL/L (ref 21–32)
CREAT BLD-MCNC: 1.04 MG/DL (ref 0.55–1.02)
D DIMER PPP FEU-MCNC: <0.27 UG/ML FEU (ref ?–0.68)
DEPRECATED RDW RBC AUTO: 42.1 FL (ref 35.1–46.3)
EGFRCR SERPLBLD CKD-EPI 2021: 59 ML/MIN/1.73M2 (ref 60–?)
EOSINOPHIL # BLD AUTO: 0.06 X10(3) UL (ref 0–0.7)
EOSINOPHIL NFR BLD AUTO: 0.8 %
ERYTHROCYTE [DISTWIDTH] IN BLOOD BY AUTOMATED COUNT: 12.7 % (ref 11–15)
GLOBULIN PLAS-MCNC: 3.2 G/DL (ref 2–3.5)
GLUCOSE BLD-MCNC: 82 MG/DL (ref 70–99)
HCT VFR BLD AUTO: 44.7 % (ref 35–48)
HGB BLD-MCNC: 15.4 G/DL (ref 12–16)
IMM GRANULOCYTES # BLD AUTO: 0.01 X10(3) UL (ref 0–1)
IMM GRANULOCYTES NFR BLD: 0.1 %
LYMPHOCYTES # BLD AUTO: 2.29 X10(3) UL (ref 1–4)
LYMPHOCYTES NFR BLD AUTO: 30.4 %
MCH RBC QN AUTO: 31.4 PG (ref 26–34)
MCHC RBC AUTO-ENTMCNC: 34.5 G/DL (ref 31–37)
MCV RBC AUTO: 91 FL (ref 80–100)
MONOCYTES # BLD AUTO: 0.52 X10(3) UL (ref 0.1–1)
MONOCYTES NFR BLD AUTO: 6.9 %
NEUTROPHILS # BLD AUTO: 4.61 X10 (3) UL (ref 1.5–7.7)
NEUTROPHILS # BLD AUTO: 4.61 X10(3) UL (ref 1.5–7.7)
NEUTROPHILS NFR BLD AUTO: 61.3 %
NT-PROBNP SERPL-MCNC: 58 PG/ML (ref ?–125)
OSMOLALITY SERPL CALC.SUM OF ELEC: 289 MOSM/KG (ref 275–295)
P AXIS: 66 DEGREES
P-R INTERVAL: 130 MS
PLATELET # BLD AUTO: 263 10(3)UL (ref 150–450)
POTASSIUM SERPL-SCNC: 4.4 MMOL/L (ref 3.5–5.1)
PROT SERPL-MCNC: 8 G/DL (ref 5.7–8.2)
Q-T INTERVAL: 352 MS
QRS DURATION: 66 MS
QTC CALCULATION (BEZET): 411 MS
R AXIS: 31 DEGREES
RBC # BLD AUTO: 4.91 X10(6)UL (ref 3.8–5.3)
SODIUM SERPL-SCNC: 140 MMOL/L (ref 136–145)
T AXIS: 66 DEGREES
TROPONIN I SERPL HS-MCNC: <3 NG/L (ref ?–34)
TSI SER-ACNC: 1.49 UIU/ML (ref 0.55–4.78)
VENTRICULAR RATE: 82 BPM
WBC # BLD AUTO: 7.5 X10(3) UL (ref 4–11)

## 2025-08-29 PROCEDURE — 84443 ASSAY THYROID STIM HORMONE: CPT | Performed by: EMERGENCY MEDICINE

## 2025-08-29 PROCEDURE — 36415 COLL VENOUS BLD VENIPUNCTURE: CPT

## 2025-08-29 PROCEDURE — 85025 COMPLETE CBC W/AUTO DIFF WBC: CPT | Performed by: EMERGENCY MEDICINE

## 2025-08-29 PROCEDURE — 93005 ELECTROCARDIOGRAM TRACING: CPT

## 2025-08-29 PROCEDURE — 84484 ASSAY OF TROPONIN QUANT: CPT | Performed by: EMERGENCY MEDICINE

## 2025-08-29 PROCEDURE — 99285 EMERGENCY DEPT VISIT HI MDM: CPT

## 2025-08-29 PROCEDURE — 80053 COMPREHEN METABOLIC PANEL: CPT | Performed by: EMERGENCY MEDICINE

## 2025-08-29 PROCEDURE — 83880 ASSAY OF NATRIURETIC PEPTIDE: CPT | Performed by: EMERGENCY MEDICINE

## 2025-08-29 PROCEDURE — 93010 ELECTROCARDIOGRAM REPORT: CPT

## 2025-08-29 PROCEDURE — 71045 X-RAY EXAM CHEST 1 VIEW: CPT

## 2025-08-29 PROCEDURE — 85379 FIBRIN DEGRADATION QUANT: CPT | Performed by: EMERGENCY MEDICINE

## 2025-08-29 PROCEDURE — 99284 EMERGENCY DEPT VISIT MOD MDM: CPT

## (undated) NOTE — Clinical Note
Thank you for the consult. I saw Ms. Lou in  the endocrine/diabetes clinic today. Please see attached my note. Please feel free to contact me with any questions. Thanks!

## (undated) NOTE — MR AVS SNAPSHOT
[facility]  [address], [address]  [phone]               Thank you for choosing us for your health care visit with Isaac Jimenez MD.  We are glad to serve you and happy to provide you with this summary o visit,  view other health information, and more. To sign up or find more information, go to [email or web address] and click on the Sign Up Now link in the [employer] box.      Enter your [employer] Activation Code exactly as it appears below along with yo